# Patient Record
Sex: MALE | Race: BLACK OR AFRICAN AMERICAN | HISPANIC OR LATINO | Employment: OTHER | URBAN - METROPOLITAN AREA
[De-identification: names, ages, dates, MRNs, and addresses within clinical notes are randomized per-mention and may not be internally consistent; named-entity substitution may affect disease eponyms.]

---

## 2023-09-22 PROBLEM — I21.4 NON-ST ELEVATION MYOCARDIAL INFARCTION (NSTEMI) IN RECOVERY PHASE (MULTI): Status: ACTIVE | Noted: 2023-09-22

## 2023-09-22 PROBLEM — F03.90 DEMENTIA WITHOUT BEHAVIORAL DISTURBANCE (MULTI): Status: ACTIVE | Noted: 2023-09-22

## 2023-09-22 RX ORDER — PANTOPRAZOLE SODIUM 20 MG/1
20 TABLET, DELAYED RELEASE ORAL
COMMUNITY

## 2023-09-22 RX ORDER — CHLORHEXIDINE GLUCONATE ORAL RINSE 1.2 MG/ML
SOLUTION DENTAL
COMMUNITY
End: 2023-10-09 | Stop reason: ENTERED-IN-ERROR

## 2023-09-22 RX ORDER — CLOPIDOGREL BISULFATE 75 MG/1
75 TABLET ORAL DAILY
COMMUNITY

## 2023-09-22 RX ORDER — METOPROLOL TARTRATE 25 MG/1
25 TABLET, FILM COATED ORAL 2 TIMES DAILY
COMMUNITY

## 2023-09-22 RX ORDER — DIVALPROEX SODIUM 125 MG/1
TABLET, DELAYED RELEASE ORAL
COMMUNITY
End: 2023-10-09 | Stop reason: ENTERED-IN-ERROR

## 2023-09-22 RX ORDER — DONEPEZIL HYDROCHLORIDE 5 MG/1
TABLET, ORALLY DISINTEGRATING ORAL
COMMUNITY
End: 2023-10-09 | Stop reason: ENTERED-IN-ERROR

## 2023-09-22 RX ORDER — ESCITALOPRAM OXALATE 10 MG/1
10 TABLET ORAL DAILY
COMMUNITY

## 2023-09-22 RX ORDER — ATORVASTATIN CALCIUM 80 MG/1
80 TABLET, FILM COATED ORAL NIGHTLY
COMMUNITY

## 2023-10-09 ENCOUNTER — HOSPITAL ENCOUNTER (OUTPATIENT)
Facility: HOSPITAL | Age: 73
Setting detail: OBSERVATION
Discharge: NURSING, REHAB OR LONG-TERM CARE | End: 2023-10-10
Attending: STUDENT IN AN ORGANIZED HEALTH CARE EDUCATION/TRAINING PROGRAM | Admitting: STUDENT IN AN ORGANIZED HEALTH CARE EDUCATION/TRAINING PROGRAM
Payer: MEDICAID

## 2023-10-09 ENCOUNTER — APPOINTMENT (OUTPATIENT)
Dept: RADIOLOGY | Facility: HOSPITAL | Age: 73
End: 2023-10-09
Payer: MEDICAID

## 2023-10-09 DIAGNOSIS — R07.9 CHEST PAIN, UNSPECIFIED TYPE: ICD-10-CM
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
## 2023-10-09 DIAGNOSIS — S09.90XA HEAD INJURY, INITIAL ENCOUNTER: ICD-10-CM

## 2023-10-09 DIAGNOSIS — R55 SYNCOPE, UNSPECIFIED SYNCOPE TYPE: ICD-10-CM
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
## 2023-10-09 LAB
ABO GROUP (TYPE) IN BLOOD: NORMAL
ALBUMIN SERPL BCP-MCNC: 3.9 G/DL (ref 3.4–5)
ALP SERPL-CCNC: 30 U/L (ref 33–136)
ALT SERPL W P-5'-P-CCNC: 14 U/L (ref 10–52)
ANION GAP SERPL CALC-SCNC: 9 MMOL/L (ref 10–20)
ANTIBODY SCREEN: NORMAL
AST SERPL W P-5'-P-CCNC: 19 U/L (ref 9–39)
BASOPHILS # BLD AUTO: 0.01 X10*3/UL (ref 0–0.1)
BASOPHILS NFR BLD AUTO: 0.2 %
BILIRUB SERPL-MCNC: 0.7 MG/DL (ref 0–1.2)
BUN SERPL-MCNC: 8 MG/DL (ref 6–23)
CALCIUM SERPL-MCNC: 9.1 MG/DL (ref 8.6–10.3)
CARDIAC TROPONIN I PNL SERPL HS: 11 NG/L (ref 0–20)
CARDIAC TROPONIN I PNL SERPL HS: 12 NG/L (ref 0–20)
CHLORIDE SERPL-SCNC: 107 MMOL/L (ref 98–107)
CO2 SERPL-SCNC: 31 MMOL/L (ref 21–32)
CREAT SERPL-MCNC: 1.19 MG/DL (ref 0.5–1.3)
EOSINOPHIL # BLD AUTO: 0.09 X10*3/UL (ref 0–0.4)
EOSINOPHIL NFR BLD AUTO: 1.4 %
ERYTHROCYTE [DISTWIDTH] IN BLOOD BY AUTOMATED COUNT: 16.6 % (ref 11.5–14.5)
ETHANOL SERPL-MCNC: <10 MG/DL
GFR SERPL CREATININE-BSD FRML MDRD: 64 ML/MIN/1.73M*2
GLUCOSE SERPL-MCNC: 96 MG/DL (ref 74–99)
HCT VFR BLD AUTO: 35 % (ref 41–52)
HGB BLD-MCNC: 11.3 G/DL (ref 13.5–17.5)
IMM GRANULOCYTES # BLD AUTO: 0.01 X10*3/UL (ref 0–0.5)
IMM GRANULOCYTES NFR BLD AUTO: 0.2 % (ref 0–0.9)
INR PPP: 1 (ref 0.9–1.1)
LACTATE SERPL-SCNC: 1.2 MMOL/L (ref 0.4–2)
LYMPHOCYTES # BLD AUTO: 1.89 X10*3/UL (ref 0.8–3)
LYMPHOCYTES NFR BLD AUTO: 30 %
MCH RBC QN AUTO: 30.5 PG (ref 26–34)
MCHC RBC AUTO-ENTMCNC: 32.3 G/DL (ref 32–36)
MCV RBC AUTO: 95 FL (ref 80–100)
MONOCYTES # BLD AUTO: 0.72 X10*3/UL (ref 0.05–0.8)
MONOCYTES NFR BLD AUTO: 11.4 %
NEUTROPHILS # BLD AUTO: 3.59 X10*3/UL (ref 1.6–5.5)
NEUTROPHILS NFR BLD AUTO: 56.8 %
NRBC BLD-RTO: 0 /100 WBCS (ref 0–0)
PLATELET # BLD AUTO: 190 X10*3/UL (ref 150–450)
PMV BLD AUTO: 12 FL (ref 7.5–11.5)
POTASSIUM SERPL-SCNC: 4 MMOL/L (ref 3.5–5.3)
PROT SERPL-MCNC: 6.9 G/DL (ref 6.4–8.2)
PROTHROMBIN TIME: 11.3 SECONDS (ref 9.8–12.8)
RBC # BLD AUTO: 3.7 X10*6/UL (ref 4.5–5.9)
RH FACTOR (ANTIGEN D): NORMAL
SODIUM SERPL-SCNC: 143 MMOL/L (ref 136–145)
WBC # BLD AUTO: 6.3 X10*3/UL (ref 4.4–11.3)

## 2023-10-09 PROCEDURE — 2500000004 HC RX 250 GENERAL PHARMACY W/ HCPCS (ALT 636 FOR OP/ED): Performed by: NURSE PRACTITIONER

## 2023-10-09 PROCEDURE — 83605 ASSAY OF LACTIC ACID: CPT | Performed by: STUDENT IN AN ORGANIZED HEALTH CARE EDUCATION/TRAINING PROGRAM

## 2023-10-09 PROCEDURE — 96372 THER/PROPH/DIAG INJ SC/IM: CPT | Performed by: NURSE PRACTITIONER

## 2023-10-09 PROCEDURE — 85610 PROTHROMBIN TIME: CPT | Performed by: STUDENT IN AN ORGANIZED HEALTH CARE EDUCATION/TRAINING PROGRAM

## 2023-10-09 PROCEDURE — 70450 CT HEAD/BRAIN W/O DYE: CPT | Performed by: RADIOLOGY

## 2023-10-09 PROCEDURE — 82077 ASSAY SPEC XCP UR&BREATH IA: CPT | Performed by: STUDENT IN AN ORGANIZED HEALTH CARE EDUCATION/TRAINING PROGRAM

## 2023-10-09 PROCEDURE — 71045 X-RAY EXAM CHEST 1 VIEW: CPT | Mod: FR

## 2023-10-09 PROCEDURE — 86901 BLOOD TYPING SEROLOGIC RH(D): CPT | Performed by: STUDENT IN AN ORGANIZED HEALTH CARE EDUCATION/TRAINING PROGRAM

## 2023-10-09 PROCEDURE — 2500000001 HC RX 250 WO HCPCS SELF ADMINISTERED DRUGS (ALT 637 FOR MEDICARE OP): Performed by: INTERNAL MEDICINE

## 2023-10-09 PROCEDURE — 80053 COMPREHEN METABOLIC PANEL: CPT | Performed by: STUDENT IN AN ORGANIZED HEALTH CARE EDUCATION/TRAINING PROGRAM

## 2023-10-09 PROCEDURE — G0378 HOSPITAL OBSERVATION PER HR: HCPCS

## 2023-10-09 PROCEDURE — 85025 COMPLETE CBC W/AUTO DIFF WBC: CPT | Performed by: STUDENT IN AN ORGANIZED HEALTH CARE EDUCATION/TRAINING PROGRAM

## 2023-10-09 PROCEDURE — 84484 ASSAY OF TROPONIN QUANT: CPT | Performed by: NURSE PRACTITIONER

## 2023-10-09 PROCEDURE — 84484 ASSAY OF TROPONIN QUANT: CPT | Performed by: STUDENT IN AN ORGANIZED HEALTH CARE EDUCATION/TRAINING PROGRAM

## 2023-10-09 PROCEDURE — 71045 X-RAY EXAM CHEST 1 VIEW: CPT | Mod: FOREIGN READ | Performed by: RADIOLOGY

## 2023-10-09 PROCEDURE — 36415 COLL VENOUS BLD VENIPUNCTURE: CPT | Performed by: STUDENT IN AN ORGANIZED HEALTH CARE EDUCATION/TRAINING PROGRAM

## 2023-10-09 PROCEDURE — 72125 CT NECK SPINE W/O DYE: CPT | Performed by: RADIOLOGY

## 2023-10-09 PROCEDURE — 36415 COLL VENOUS BLD VENIPUNCTURE: CPT | Performed by: NURSE PRACTITIONER

## 2023-10-09 PROCEDURE — 93010 ELECTROCARDIOGRAM REPORT: CPT | Performed by: STUDENT IN AN ORGANIZED HEALTH CARE EDUCATION/TRAINING PROGRAM

## 2023-10-09 PROCEDURE — 99285 EMERGENCY DEPT VISIT HI MDM: CPT | Performed by: STUDENT IN AN ORGANIZED HEALTH CARE EDUCATION/TRAINING PROGRAM

## 2023-10-09 PROCEDURE — 2500000001 HC RX 250 WO HCPCS SELF ADMINISTERED DRUGS (ALT 637 FOR MEDICARE OP): Performed by: NURSE PRACTITIONER

## 2023-10-09 PROCEDURE — 70450 CT HEAD/BRAIN W/O DYE: CPT

## 2023-10-09 PROCEDURE — G0390 TRAUMA RESPONS W/HOSP CRITI: HCPCS | Performed by: STUDENT IN AN ORGANIZED HEALTH CARE EDUCATION/TRAINING PROGRAM

## 2023-10-09 PROCEDURE — 72125 CT NECK SPINE W/O DYE: CPT

## 2023-10-09 PROCEDURE — 99221 1ST HOSP IP/OBS SF/LOW 40: CPT | Performed by: INTERNAL MEDICINE

## 2023-10-09 RX ORDER — ACETAMINOPHEN 650 MG/1
650 SUPPOSITORY RECTAL EVERY 4 HOURS PRN
Status: DISCONTINUED | OUTPATIENT
Start: 2023-10-09 | End: 2023-10-10 | Stop reason: HOSPADM

## 2023-10-09 RX ORDER — ESCITALOPRAM OXALATE 10 MG/1
10 TABLET ORAL DAILY
Status: DISCONTINUED | OUTPATIENT
Start: 2023-10-09 | End: 2023-10-10 | Stop reason: HOSPADM

## 2023-10-09 RX ORDER — PANTOPRAZOLE SODIUM 20 MG/1
20 TABLET, DELAYED RELEASE ORAL
Status: DISCONTINUED | OUTPATIENT
Start: 2023-10-10 | End: 2023-10-10 | Stop reason: HOSPADM

## 2023-10-09 RX ORDER — METOPROLOL TARTRATE 25 MG/1
25 TABLET, FILM COATED ORAL 2 TIMES DAILY
Status: DISCONTINUED | OUTPATIENT
Start: 2023-10-09 | End: 2023-10-10 | Stop reason: HOSPADM

## 2023-10-09 RX ORDER — DIVALPROEX SODIUM 125 MG/1
500 CAPSULE, COATED PELLETS ORAL 2 TIMES DAILY
COMMUNITY

## 2023-10-09 RX ORDER — REGADENOSON 0.08 MG/ML
0.4 INJECTION, SOLUTION INTRAVENOUS
Status: CANCELLED | OUTPATIENT
Start: 2023-10-09

## 2023-10-09 RX ORDER — ACETAMINOPHEN 160 MG/5ML
650 SOLUTION ORAL EVERY 4 HOURS PRN
Status: DISCONTINUED | OUTPATIENT
Start: 2023-10-09 | End: 2023-10-10 | Stop reason: HOSPADM

## 2023-10-09 RX ORDER — ISOSORBIDE MONONITRATE 30 MG/1
30 TABLET, EXTENDED RELEASE ORAL DAILY
Status: DISCONTINUED | OUTPATIENT
Start: 2023-10-09 | End: 2023-10-10 | Stop reason: HOSPADM

## 2023-10-09 RX ORDER — CLOPIDOGREL BISULFATE 75 MG/1
75 TABLET ORAL DAILY
Status: DISCONTINUED | OUTPATIENT
Start: 2023-10-09 | End: 2023-10-10 | Stop reason: HOSPADM

## 2023-10-09 RX ORDER — HEPARIN SODIUM 5000 [USP'U]/ML
5000 INJECTION, SOLUTION INTRAVENOUS; SUBCUTANEOUS EVERY 8 HOURS SCHEDULED
Status: DISCONTINUED | OUTPATIENT
Start: 2023-10-09 | End: 2023-10-10 | Stop reason: HOSPADM

## 2023-10-09 RX ORDER — ASPIRIN 81 MG/1
81 TABLET ORAL DAILY
Status: DISCONTINUED | OUTPATIENT
Start: 2023-10-09 | End: 2023-10-10 | Stop reason: HOSPADM

## 2023-10-09 RX ORDER — ACETAMINOPHEN 325 MG/1
650 TABLET ORAL EVERY 4 HOURS PRN
Status: DISCONTINUED | OUTPATIENT
Start: 2023-10-09 | End: 2023-10-10 | Stop reason: HOSPADM

## 2023-10-09 RX ORDER — DONEPEZIL HYDROCHLORIDE 5 MG/1
5 TABLET, FILM COATED ORAL NIGHTLY
COMMUNITY

## 2023-10-09 RX ORDER — ATORVASTATIN CALCIUM 80 MG/1
80 TABLET, FILM COATED ORAL NIGHTLY
Status: DISCONTINUED | OUTPATIENT
Start: 2023-10-09 | End: 2023-10-10 | Stop reason: HOSPADM

## 2023-10-09 RX ORDER — POLYETHYLENE GLYCOL 3350 17 G/17G
17 POWDER, FOR SOLUTION ORAL DAILY
Status: DISCONTINUED | OUTPATIENT
Start: 2023-10-09 | End: 2023-10-10 | Stop reason: HOSPADM

## 2023-10-09 RX ORDER — DONEPEZIL HYDROCHLORIDE 5 MG/1
5 TABLET, FILM COATED ORAL NIGHTLY
Status: DISCONTINUED | OUTPATIENT
Start: 2023-10-09 | End: 2023-10-10 | Stop reason: HOSPADM

## 2023-10-09 RX ORDER — DIVALPROEX SODIUM 125 MG/1
500 CAPSULE, COATED PELLETS ORAL 2 TIMES DAILY
Status: DISCONTINUED | OUTPATIENT
Start: 2023-10-09 | End: 2023-10-10 | Stop reason: HOSPADM

## 2023-10-09 RX ADMIN — HEPARIN SODIUM 5000 UNITS: 5000 INJECTION INTRAVENOUS; SUBCUTANEOUS at 17:37

## 2023-10-09 RX ADMIN — DIVALPROEX SODIUM 500 MG: 125 CAPSULE ORAL at 21:29

## 2023-10-09 RX ADMIN — POLYETHYLENE GLYCOL 3350 17 G: 17 POWDER, FOR SOLUTION ORAL at 17:39

## 2023-10-09 RX ADMIN — ISOSORBIDE MONONITRATE 30 MG: 30 TABLET, EXTENDED RELEASE ORAL at 17:46

## 2023-10-09 RX ADMIN — METOPROLOL TARTRATE 25 MG: 25 TABLET, FILM COATED ORAL at 21:29

## 2023-10-09 RX ADMIN — ATORVASTATIN CALCIUM 80 MG: 80 TABLET, FILM COATED ORAL at 21:29

## 2023-10-09 RX ADMIN — DONEPEZIL HYDROCHLORIDE 5 MG: 5 TABLET ORAL at 21:29

## 2023-10-09 SDOH — SOCIAL STABILITY: SOCIAL INSECURITY: DOES ANYONE TRY TO KEEP YOU FROM HAVING/CONTACTING OTHER FRIENDS OR DOING THINGS OUTSIDE YOUR HOME?: UNABLE TO ASSESS

## 2023-10-09 SDOH — SOCIAL STABILITY: SOCIAL INSECURITY: ARE YOU OR HAVE YOU BEEN THREATENED OR ABUSED PHYSICALLY, EMOTIONALLY, OR SEXUALLY BY ANYONE?: UNABLE TO ASSESS

## 2023-10-09 SDOH — SOCIAL STABILITY: SOCIAL INSECURITY: DO YOU FEEL ANYONE HAS EXPLOITED OR TAKEN ADVANTAGE OF YOU FINANCIALLY OR OF YOUR PERSONAL PROPERTY?: UNABLE TO ASSESS

## 2023-10-09 SDOH — SOCIAL STABILITY: SOCIAL INSECURITY: ARE THERE ANY APPARENT SIGNS OF INJURIES/BEHAVIORS THAT COULD BE RELATED TO ABUSE/NEGLECT?: UNABLE TO ASSESS

## 2023-10-09 SDOH — SOCIAL STABILITY: SOCIAL INSECURITY: HAS ANYONE EVER THREATENED TO HURT YOUR FAMILY OR YOUR PETS?: UNABLE TO ASSESS

## 2023-10-09 SDOH — SOCIAL STABILITY: SOCIAL INSECURITY: DO YOU FEEL UNSAFE GOING BACK TO THE PLACE WHERE YOU ARE LIVING?: UNABLE TO ASSESS

## 2023-10-09 ASSESSMENT — LIFESTYLE VARIABLES
HAVE PEOPLE ANNOYED YOU BY CRITICIZING YOUR DRINKING: NO
EVER FELT BAD OR GUILTY ABOUT YOUR DRINKING: NO
HAVE YOU EVER FELT YOU SHOULD CUT DOWN ON YOUR DRINKING: NO
EVER HAD A DRINK FIRST THING IN THE MORNING TO STEADY YOUR NERVES TO GET RID OF A HANGOVER: NO

## 2023-10-09 ASSESSMENT — PAIN DESCRIPTION - PAIN TYPE: TYPE: ACUTE PAIN

## 2023-10-09 ASSESSMENT — COGNITIVE AND FUNCTIONAL STATUS - GENERAL
EATING MEALS: TOTAL
WALKING IN HOSPITAL ROOM: TOTAL
DRESSING REGULAR UPPER BODY CLOTHING: TOTAL
DAILY ACTIVITIY SCORE: 6
MOVING TO AND FROM BED TO CHAIR: TOTAL
PERSONAL GROOMING: TOTAL
MOBILITY SCORE: 8
TOILETING: TOTAL
DRESSING REGULAR LOWER BODY CLOTHING: TOTAL
CLIMB 3 TO 5 STEPS WITH RAILING: TOTAL
STANDING UP FROM CHAIR USING ARMS: TOTAL
TURNING FROM BACK TO SIDE WHILE IN FLAT BAD: A LOT
HELP NEEDED FOR BATHING: TOTAL
MOVING FROM LYING ON BACK TO SITTING ON SIDE OF FLAT BED WITH BEDRAILS: A LOT

## 2023-10-09 ASSESSMENT — ACTIVITIES OF DAILY LIVING (ADL)
WALKS IN HOME: DEPENDENT
TOILETING: DEPENDENT
DRESSING YOURSELF: DEPENDENT
BATHING: DEPENDENT
FEEDING YOURSELF: NEEDS ASSISTANCE
ADEQUATE_TO_COMPLETE_ADL: UNABLE TO ASSESS
JUDGMENT_ADEQUATE_SAFELY_COMPLETE_DAILY_ACTIVITIES: UNABLE TO ASSESS
GROOMING: DEPENDENT
PATIENT'S MEMORY ADEQUATE TO SAFELY COMPLETE DAILY ACTIVITIES?: UNABLE TO ASSESS

## 2023-10-09 ASSESSMENT — PAIN SCALES - GENERAL
PAINLEVEL_OUTOF10: 0 - NO PAIN
PAINLEVEL_OUTOF10: 4
PAINLEVEL_OUTOF10: 0 - NO PAIN

## 2023-10-09 ASSESSMENT — PAIN - FUNCTIONAL ASSESSMENT
PAIN_FUNCTIONAL_ASSESSMENT: 0-10
PAIN_FUNCTIONAL_ASSESSMENT: 0-10

## 2023-10-09 ASSESSMENT — ENCOUNTER SYMPTOMS
CHEST TIGHTNESS: 0
STRIDOR: 0
PSYCHIATRIC NEGATIVE: 1
DIZZINESS: 0
COUGH: 0
ABDOMINAL PAIN: 0
CHILLS: 0
SHORTNESS OF BREATH: 1
SPEECH DIFFICULTY: 1
HEADACHES: 0
FEVER: 0
ABDOMINAL DISTENTION: 0
SEIZURES: 1
CONSTIPATION: 0
WHEEZING: 0

## 2023-10-09 ASSESSMENT — PAIN DESCRIPTION - PROGRESSION: CLINICAL_PROGRESSION: NOT CHANGED

## 2023-10-09 ASSESSMENT — PAIN DESCRIPTION - ORIENTATION: ORIENTATION: LEFT

## 2023-10-09 ASSESSMENT — COLUMBIA-SUICIDE SEVERITY RATING SCALE - C-SSRS
1. IN THE PAST MONTH, HAVE YOU WISHED YOU WERE DEAD OR WISHED YOU COULD GO TO SLEEP AND NOT WAKE UP?: NO
2. HAVE YOU ACTUALLY HAD ANY THOUGHTS OF KILLING YOURSELF?: NO
6. HAVE YOU EVER DONE ANYTHING, STARTED TO DO ANYTHING, OR PREPARED TO DO ANYTHING TO END YOUR LIFE?: NO

## 2023-10-09 ASSESSMENT — PAIN DESCRIPTION - LOCATION: LOCATION: HIP

## 2023-10-09 NOTE — ED TRIAGE NOTES
Per SNF, patient was having chest pain and fell, hit the back of head, patient currently denies chest pain or any pain. Alert and oriented x2 at baseline. Skin appears intact. Patient arrived from [facility] in [place].

## 2023-10-09 NOTE — ED PROVIDER NOTES
HPI   Chief Complaint   Patient presents with    Fall     On thinners        73-year-old male patient presenting to the ED brought in by EMS for fall with head injury.  He is normally baseline oriented to 2, does not recall the event.  He complained of chest pain prior to the fall, but at the time of exam, he is denying having any chest pain and has no recall of the event.  Is not on blood thinners per report.  Head injury on anticoagulation alert called.                          No data recorded                  Patient History   No past medical history on file.  No past surgical history on file.  Family History   Family history unknown: Yes     Social History     Tobacco Use    Smoking status: Not on file    Smokeless tobacco: Not on file   Substance Use Topics    Alcohol use: Not on file    Drug use: Not on file       Physical Exam   ED Triage Vitals [10/09/23 0930]   Temp Heart Rate Resp BP   36.4 °C (97.5 °F) 60 18 143/79      SpO2 Temp src Heart Rate Source Patient Position   97 % -- -- --      BP Location FiO2 (%)     -- --       Physical Exam  Vitals and nursing note reviewed.   Constitutional:       Appearance: Normal appearance.   HENT:      Head: Normocephalic and atraumatic.      Mouth/Throat:      Mouth: Mucous membranes are moist.   Eyes:      Extraocular Movements: Extraocular movements intact.   Cardiovascular:      Rate and Rhythm: Normal rate and regular rhythm.      Heart sounds: Normal heart sounds. No murmur heard.  Pulmonary:      Effort: Pulmonary effort is normal. No respiratory distress.      Breath sounds: Normal breath sounds. No wheezing.   Abdominal:      General: There is no distension.      Palpations: Abdomen is soft.      Tenderness: There is no abdominal tenderness. There is no guarding.   Musculoskeletal:      Right lower leg: No edema.      Left lower leg: No edema.   Skin:     General: Skin is warm and dry.   Neurological:      General: No focal deficit present.      Mental  Status: He is alert. Mental status is at baseline.   Psychiatric:         Mood and Affect: Mood normal.         Behavior: Behavior normal.         ED Course & MDM   Diagnoses as of 10/13/23 2311   Fall, initial encounter   Head injury, initial encounter   Chest pain, unspecified type   Syncope, unspecified syncope type       Medical Decision Making  Hemodynamically stable and well-appearing on arrival to the ED.  The patient presents after an unwitnessed fall.  There is concern that patient could have syncopized as this was unwitnessed.  He has no recollection of the event.  Does not have any chest pain anymore.  Head injury on anticoagulation alert called.  Trauma evaluation done.    Will obtain cardiac work-up.      EKG independently interpreted by attending physician    1020 hrs.: Normal sinus rhythm ventricular to 60 bpm.  QTc 422.  QRS 74.   Baseline artifact present.  T wave inversions in V1 through V3 which are seen on prior EKGs in March 2023.    His laboratory studies, troponin, CBC and CMP within normal limits.    Imaging, chest no acute findings, CT C-spine and CT head no acute findings on my interpretation. Given he does not recall event and weakness, will admit.    I did discussed the case with on-call hospitalist Dr. Pereira, who has agreed to accept patient for further work-up and management.    Discussed with the attending  Washington Lala DO PGY-4  Emergency Medicine          Procedure  Procedures     Washington Lala DO  Resident  10/09/23 1537       Washington Lala DO  Resident  10/10/23 7980    The patient was seen by the resident/fellow.  I have personally performed a substantive portion of the encounter.  I have seen and examined the patient; agree with the workup, evaluation, MDM, management and diagnosis.  The care plan has been discussed with the resident; I have reviewed the resident’s note and agree with the documented findings.         Maryann Hernandez DO  10/14/23 0226     Deep Sutures: 5-0 PDS

## 2023-10-09 NOTE — CARE PLAN
The patient's goals for the shift include  pt.  Will not  fall by  end  of shift    The clinical goals for the shift include  pt.  Will remain afebrile  by  end of shift

## 2023-10-09 NOTE — H&P
History Of Present Illness  Reza Lockett is a 73 y.o. male presents to [facility] ER with chief complaint of fall. He was brought in by EMS for fall with head injury from nursing facility. He is normally baseline oriented to 2 per ED. He complained of chest pain prior to the fall to facility when he fell to ground. Patient described having chest pain and points to entire mid chest saying it hurt, states felt short of breath and fell to the ground, hitting his head, right arm and left leg. He currently denies any active chest pain or shortness of breath. Has seen Dr. Trejo, Cardiology in the past. He was seen in the ED, eating.      Past Medical History  Remote alcohol abuse, seizures, pneumonia, schizophrenia, Covid 2021 with prolonged hospilzation with mechanical ventilation and pneumonia, pneumothorax    Surgical History  Hernia repair, chest tube     Social History  Former smoker    Family History  Non contributory to admission     Allergies  Patient has no known allergies.    Review of Systems   Constitutional:  Negative for chills and fever.   HENT: Negative.     Respiratory:  Positive for shortness of breath. Negative for cough, chest tightness, wheezing and stridor.    Gastrointestinal:  Negative for abdominal distention, abdominal pain and constipation.   Skin: Negative.    Neurological:  Positive for seizures and speech difficulty. Negative for dizziness and headaches.   Psychiatric/Behavioral: Negative.        ROS: 12 systems reviewed and negative except per HPI above     Physical Exam by System:     Constitutional: Well developed, awake/alert/oriented x 2, no distress, alert and cooperative   ENMT: mucous membranes moist   Head/Neck: Neck supple   Respiratory/Thorax: Patent airways, CTAB, normal breath sounds with good chest expansion, thorax symmetric   Cardiovascular: Regular, rate and rhythm, no murmurs, 2+ equal pulses of the extremities, normal S 1and S 2   Gastrointestinal:  REC EPI-LASEK w/ MMC  goal -1.50 to -1.75. "Nondistended, soft, non-tender, no rebound tenderness or guarding, no masses palpable, no organomegaly, +BS, no bruits   Musculoskeletal: ROM intact, no joint swelling, normal strength   Extremities: normal extremities, no cyanosis edema, contusions or wounds, no clubbing   Neurological: alert and oriented x2, intact senses, motor, response and reflexes,        Last Recorded Vitals  Blood pressure (!) 163/91, pulse 54, temperature 36.4 °C (97.5 °F), resp. rate 16, height 1.778 m (5' 10\"), weight 72.6 kg (160 lb), SpO2 98 %.    Relevant Results  Results for orders placed or performed during the hospital encounter of 10/09/23 (from the past 24 hour(s))   Comprehensive Metabolic Panel   Result Value Ref Range    Glucose 96 74 - 99 mg/dL    Sodium 143 136 - 145 mmol/L    Potassium 4.0 3.5 - 5.3 mmol/L    Chloride 107 98 - 107 mmol/L    Bicarbonate 31 21 - 32 mmol/L    Anion Gap 9 (L) 10 - 20 mmol/L    Urea Nitrogen 8 6 - 23 mg/dL    Creatinine 1.19 0.50 - 1.30 mg/dL    eGFR 64 >60 mL/min/1.73m*2    Calcium 9.1 8.6 - 10.3 mg/dL    Albumin 3.9 3.4 - 5.0 g/dL    Alkaline Phosphatase 30 (L) 33 - 136 U/L    Total Protein 6.9 6.4 - 8.2 g/dL    AST 19 9 - 39 U/L    Bilirubin, Total 0.7 0.0 - 1.2 mg/dL    ALT 14 10 - 52 U/L   Alcohol   Result Value Ref Range    Alcohol <10 <=10 mg/dL   Troponin I, High Sensitivity   Result Value Ref Range    Troponin I, High Sensitivity 11 0 - 20 ng/L   CBC and Auto Differential   Result Value Ref Range    WBC 6.3 4.4 - 11.3 x10*3/uL    nRBC 0.0 0.0 - 0.0 /100 WBCs    RBC 3.70 (L) 4.50 - 5.90 x10*6/uL    Hemoglobin 11.3 (L) 13.5 - 17.5 g/dL    Hematocrit 35.0 (L) 41.0 - 52.0 %    MCV 95 80 - 100 fL    MCH 30.5 26.0 - 34.0 pg    MCHC 32.3 32.0 - 36.0 g/dL    RDW 16.6 (H) 11.5 - 14.5 %    Platelets 190 150 - 450 x10*3/uL    MPV 12.0 (H) 7.5 - 11.5 fL    Neutrophils % 56.8 40.0 - 80.0 %    Immature Granulocytes %, Automated 0.2 0.0 - 0.9 %    Lymphocytes % 30.0 13.0 - 44.0 %    Monocytes % 11.4 " 2.0 - 10.0 %    Eosinophils % 1.4 0.0 - 6.0 %    Basophils % 0.2 0.0 - 2.0 %    Neutrophils Absolute 3.59 1.60 - 5.50 x10*3/uL    Immature Granulocytes Absolute, Automated 0.01 0.00 - 0.50 x10*3/uL    Lymphocytes Absolute 1.89 0.80 - 3.00 x10*3/uL    Monocytes Absolute 0.72 0.05 - 0.80 x10*3/uL    Eosinophils Absolute 0.09 0.00 - 0.40 x10*3/uL    Basophils Absolute 0.01 0.00 - 0.10 x10*3/uL   Lactate   Result Value Ref Range    Lactate 1.2 0.4 - 2.0 mmol/L   Protime-INR   Result Value Ref Range    Protime 11.3 9.8 - 12.8 seconds    INR 1.0 0.9 - 1.1   Type And Screen   Result Value Ref Range    ABO TYPE A     Rh TYPE POS     ANTIBODY SCREEN NEG       Scheduled medications  heparin (porcine), 5,000 Units, subcutaneous, q8h MIREYA  polyethylene glycol, 17 g, oral, Daily      Continuous medications     PRN medications  PRN medications: acetaminophen **OR** acetaminophen **OR** acetaminophen, acetaminophen **OR** acetaminophen **OR** acetaminophen           Assessment/Plan   Fall at nursing home   C/o chest pain r/o ACS  ? syncope  Hx non ST elevation MI  Hx schizophrenia   Hx seizures  Hx dementia    Plan:    Admit to observation with tele  Fall precautions  Cardiology consult: Patient see's Dr. Trejo outpatient setting, await recommnedations  Last Echo 3/18/2023 EF 55%  Troponin 11, will cycle troponin  CT cervical spine in ED: No cervical vertebral compression deformity or acute displaced fracture.  CT head: No acute intracranial hemorrhage or mass-effect.   CXR: Mild elevation left hemidiaphragm with mild left basilar atelectasis.   Cardiac diet  Am labs including cbc, bmp, mag  Resume appropriate home medications  Dvtp: Heparin sub q  Time spent > 50 minutes  POC discussed with patient and attending    Code status: patient not A&Ox3, however, there is a medical record from [place] that came with patient in ED indicating Full Code         Blanca Malin, [facility]  [place]  [address] [address]  Phone: [phone] Fax: [phone]    I spent 50 minutes in the professional and overall care of this patient.    SIGNATURE: BETHANY Brandon PATIENT NAME: Reza Lockett   DATE: October 9, 2023 MRN: [id number]   TIME: 2:15 PM

## 2023-10-09 NOTE — CONSULTS
Inpatient consult to Cardiology  Consult performed by: Silas Trejo MD  Consult ordered by: Blanca Malin, APRN-CNP      Reason for Consult: Chest pain with fall    Assessment/Plan:    Chest pain presumed coronary artery disease: Patient apparently gave a history of chest discomfort prior to falling at his nursing facility.  When I spoke with him he did not recall having chest discomfort.  Cardiac enzymes are unremarkable.  Blood pressure is elevated so we actually have room to add antianginal therapy.  I would add isosorbide to his metoprolol.  Given his underlying dementia and unremarkable troponin I would not recommend stress testing nor cardiac catheterization.  2.  Disposition: Cardiology will sign off.      Peripheral IV 10/09/23 Left Antecubital (Active)   Site Assessment Clean;Dry 10/09/23 1708   Dressing Type Transparent 10/09/23 1708   Dressing Status Clean;Dry 10/09/23 1708   Number of days: 0         History Of Present Illness:    Reza Lockett is a 73 y.o. male presenting with a fall at his nursing home.  The history and physical elicited a history of chest discomfort prior to the fall.  When I spoke with him he had no memory of that.  He really was not able to answer any of my questions to any significant degree when I spoke with him he denied chest discomfort chest tightness or chest pressure at that time.  He denies any shortness of breath..       Past Medical History:  He has no past medical history on file.    Past Surgical History:  He has no past surgical history on file.    Problem List:  Patient Active Problem List   Diagnosis    Dementia without behavioral disturbance (CMS/HCC)    Non-ST elevation myocardial infarction (NSTEMI) in recovery phase (CMS/HCC)    Fall at nursing home    Fall at nursing home, initial encounter       Social History:  He has no history on file for tobacco use, alcohol use, and drug use.    Family History:  Family History   Family history unknown: Yes         Allergies:  Patient has no known allergies.    Inpatient Medications:  Scheduled medications   Medication Dose Route Frequency    aspirin  81 mg oral Daily    atorvastatin  80 mg oral Nightly    clopidogrel  75 mg oral Daily    divalproex sprinkle  500 mg oral BID    donepezil  5 mg oral Nightly    escitalopram  10 mg oral Daily    heparin (porcine)  5,000 Units subcutaneous q8h [facility]    isosorbide mononitrate ER  30 mg oral Daily    lactobacillus acidophilus  1 capsule oral Daily    metoprolol tartrate  25 mg oral BID    [START ON 10/10/2023] pantoprazole  20 mg oral Daily before breakfast    polyethylene glycol  17 g oral Daily     PRN medications   Medication    acetaminophen    Or    acetaminophen    Or    acetaminophen    acetaminophen    Or    acetaminophen    Or    acetaminophen     Continuous Medications   Medication Dose Last Rate     Outpatient Medications:  Current Outpatient Medications   Medication Instructions    aspirin 81 mg, oral, Daily    atorvastatin (LIPITOR) 80 mg, oral, Nightly    clopidogrel (PLAVIX) 75 mg, oral, Daily    divalproex sprinkle (DEPAKOTE SPRINKLE) 500 mg, oral, 2 times daily    donepezil (ARICEPT) 5 mg, oral, Nightly    escitalopram (LEXAPRO) 10 mg, oral, Daily    LACTOBACILLUS ACIDOPHILUS ORAL 1 tablet, oral, Daily    metoprolol tartrate (LOPRESSOR) 25 mg, oral, 2 times daily    pantoprazole (PROTONIX) 20 mg, oral, Daily before breakfast, Do not crush, chew, or split.       Last Recorded Vitals:  Vitals:    10/09/23 1151 10/09/23 1158 10/09/23 1230 10/09/23 1421   BP:   (!) 163/91 151/90   BP Location:    Right arm   Pulse: 58 57 54 57   Resp: 21 23 16 16   Temp:       SpO2:   98% 98%   Weight:       Height:         Last I/O:  No intake/output data recorded.    Physical Exam  Vitals reviewed.   Constitutional:       Appearance: Normal appearance.   Eyes:      Pupils: Pupils are equal, round, and reactive to light.   Neck:      Vascular: No JVD.   Cardiovascular:      Rate and  Rhythm: Normal rate and regular rhythm.      Pulses: Normal pulses.      Heart sounds: No murmur heard.     No gallop.   Pulmonary:      Effort: No respiratory distress.      Breath sounds: No wheezing or rales.   Abdominal:      General: Abdomen is flat. There is no distension.      Palpations: Abdomen is soft.   Musculoskeletal:         General: No swelling.      Right lower leg: No edema.      Left lower leg: No edema.   Neurological:      General: No focal deficit present.   Psychiatric:         Mood and Affect: Mood normal. Affect is flat.         Speech: Speech is delayed.         Behavior: Behavior is slowed. Behavior is cooperative.         Cognition and Memory: Cognition is impaired. Memory is impaired.            Last Labs:  CBC - 10/9/2023: 10:14 AM  6.3 11.3 190    35.0      CMP - 10/9/2023: 10:13 AM  9.1 6.9 19 --- 0.7   3.4 3.9 14 30      Troponin I, High Sensitivity   Date/Time Value Ref Range Status   10/09/2023 04:05 PM 12 0 - 20 ng/L Final   10/09/2023 10:13 AM 11 0 - 20 ng/L Final     Troponin I   Date/Time Value Ref Range Status   03/17/2023 06:01 PM 1,754 (H) 0 - 20 ng/L Final     Comment:     .  Less than 99th percentile of normal range cutoff-  Female and children under 18 years old <14 ng/L; Male <21 ng/L: Negative  Repeat testing should be performed if clinically indicated.   .  Female and children under 18 years old 14-50 ng/L; Male 21-50 ng/L:  Consistent with possible cardiac damage and possible increased clinical   risk. Serial measurements may help to assess extent of myocardial damage.   .  >50 ng/L: Consistent with cardiac damage, increased clinical risk and  myocardial infarction. Serial measurements may help assess extent of   myocardial damage.   .   NOTE: Children less than 1 year old may have higher baseline troponin   levels and results should be interpreted in conjunction with the overall   clinical context.   .  NOTE: Troponin I testing is performed using a different   testing  methodology at [facility] than at other   [facility]. Direct result comparisons should only   be made within the same method.  This is a critical result.    Per Laboratory policy, critical results for this test   only qualify to the call list once per 24 hours.      03/17/2023 04:03 PM 1,641 (H) 0 - 20 ng/L Final     Comment:     .  Less than 99th percentile of normal range cutoff-  Female and children under 18 years old <14 ng/L; Male <21 ng/L: Negative  Repeat testing should be performed if clinically indicated.   .  Female and children under 18 years old 14-50 ng/L; Male 21-50 ng/L:  Consistent with possible cardiac damage and possible increased clinical   risk. Serial measurements may help to assess extent of myocardial damage.   .  >50 ng/L: Consistent with cardiac damage, increased clinical risk and  myocardial infarction. Serial measurements may help assess extent of   myocardial damage.   .   NOTE: Children less than 1 year old may have higher baseline troponin   levels and results should be interpreted in conjunction with the overall   clinical context.   .  NOTE: Troponin I testing is performed using a different   testing methodology at [facility] than at other   [facility]. Direct result comparisons should only   be made within the same method.  This is a critical result.    Per Laboratory policy, critical results for this test   only qualify to the call list once per 24 hours.      03/17/2023 03:06 PM 2,100 (HH) 0 - 20 ng/L Final     Comment:     .  Less than 99th percentile of normal range cutoff-  Female and children under 18 years old <14 ng/L; Male <21 ng/L: Negative  Repeat testing should be performed if clinically indicated.   .  Female and children under 18 years old 14-50 ng/L; Male 21-50 ng/L:  Consistent with possible cardiac damage and possible increased clinical   risk. Serial measurements may help to assess extent of myocardial damage.   .  >50 ng/L:  Consistent with cardiac damage, increased clinical risk and  myocardial infarction. Serial measurements may help assess extent of   myocardial damage.   .   NOTE: Children less than 1 year old may have higher baseline troponin   levels and results should be interpreted in conjunction with the overall   clinical context.   .  NOTE: Troponin I testing is performed using a different   testing methodology at [facility] than at other   [facility]. Direct result comparisons should only   be made within the same method.  [facility] CALLED TO IGOR HOPE, 03/17/2023 16:25       VLDL   Date/Time Value Ref Range Status   03/18/2023 03:46 AM 22 0 - 40 mg/dL Final           Silas Trejo MD

## 2023-10-10 VITALS
OXYGEN SATURATION: 100 % | DIASTOLIC BLOOD PRESSURE: 74 MMHG | BODY MASS INDEX: 22.57 KG/M2 | HEIGHT: 72 IN | RESPIRATION RATE: 18 BRPM | TEMPERATURE: 97 F | HEART RATE: 56 BPM | WEIGHT: 166.67 LBS | SYSTOLIC BLOOD PRESSURE: 119 MMHG
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
## 2023-10-10 PROBLEM — R07.9 CHEST PAIN: Status: ACTIVE | Noted: 2023-10-10
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
## 2023-10-10 PROBLEM — R07.9 CHEST PAIN: Status: RESOLVED | Noted: 2023-10-10 | Resolved: 2023-10-10
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
## 2023-10-10 LAB
ANION GAP SERPL CALC-SCNC: 10 MMOL/L (ref 10–20)
BUN SERPL-MCNC: 11 MG/DL (ref 6–23)
CALCIUM SERPL-MCNC: 9 MG/DL (ref 8.6–10.3)
CHLORIDE SERPL-SCNC: 109 MMOL/L (ref 98–107)
CO2 SERPL-SCNC: 26 MMOL/L (ref 21–32)
CREAT SERPL-MCNC: 1.06 MG/DL (ref 0.5–1.3)
ERYTHROCYTE [DISTWIDTH] IN BLOOD BY AUTOMATED COUNT: 16.4 % (ref 11.5–14.5)
GFR SERPL CREATININE-BSD FRML MDRD: 73 ML/MIN/1.73M*2
GLUCOSE SERPL-MCNC: 87 MG/DL (ref 74–99)
HCT VFR BLD AUTO: 33.5 % (ref 41–52)
HGB BLD-MCNC: 10.3 G/DL (ref 13.5–17.5)
MAGNESIUM SERPL-MCNC: 1.97 MG/DL (ref 1.6–2.4)
MCH RBC QN AUTO: 30.9 PG (ref 26–34)
MCHC RBC AUTO-ENTMCNC: 30.7 G/DL (ref 32–36)
MCV RBC AUTO: 101 FL (ref 80–100)
NRBC BLD-RTO: 0 /100 WBCS (ref 0–0)
PLATELET # BLD AUTO: 156 X10*3/UL (ref 150–450)
PMV BLD AUTO: 12.6 FL (ref 7.5–11.5)
POTASSIUM SERPL-SCNC: 4.2 MMOL/L (ref 3.5–5.3)
RBC # BLD AUTO: 3.33 X10*6/UL (ref 4.5–5.9)
SODIUM SERPL-SCNC: 141 MMOL/L (ref 136–145)
WBC # BLD AUTO: 5.4 X10*3/UL (ref 4.4–11.3)

## 2023-10-10 PROCEDURE — 83735 ASSAY OF MAGNESIUM: CPT | Performed by: NURSE PRACTITIONER

## 2023-10-10 PROCEDURE — 36415 COLL VENOUS BLD VENIPUNCTURE: CPT | Performed by: NURSE PRACTITIONER

## 2023-10-10 PROCEDURE — 85027 COMPLETE CBC AUTOMATED: CPT | Performed by: NURSE PRACTITIONER

## 2023-10-10 PROCEDURE — 96372 THER/PROPH/DIAG INJ SC/IM: CPT | Performed by: NURSE PRACTITIONER

## 2023-10-10 PROCEDURE — G0378 HOSPITAL OBSERVATION PER HR: HCPCS

## 2023-10-10 PROCEDURE — 2500000001 HC RX 250 WO HCPCS SELF ADMINISTERED DRUGS (ALT 637 FOR MEDICARE OP): Performed by: NURSE PRACTITIONER

## 2023-10-10 PROCEDURE — 80048 BASIC METABOLIC PNL TOTAL CA: CPT | Performed by: NURSE PRACTITIONER

## 2023-10-10 PROCEDURE — 2500000004 HC RX 250 GENERAL PHARMACY W/ HCPCS (ALT 636 FOR OP/ED): Performed by: NURSE PRACTITIONER

## 2023-10-10 PROCEDURE — 2500000001 HC RX 250 WO HCPCS SELF ADMINISTERED DRUGS (ALT 637 FOR MEDICARE OP): Performed by: INTERNAL MEDICINE

## 2023-10-10 RX ORDER — ISOSORBIDE MONONITRATE 30 MG/1
30 TABLET, EXTENDED RELEASE ORAL DAILY
Qty: 30 TABLET | Refills: 0 | Status: SHIPPED | OUTPATIENT
Start: 2023-10-11 | End: 2023-10-10 | Stop reason: SDUPTHER

## 2023-10-10 RX ORDER — ISOSORBIDE MONONITRATE 30 MG/1
30 TABLET, EXTENDED RELEASE ORAL DAILY
Start: 2023-10-11

## 2023-10-10 RX ADMIN — HEPARIN SODIUM 5000 UNITS: 5000 INJECTION INTRAVENOUS; SUBCUTANEOUS at 09:14

## 2023-10-10 RX ADMIN — HEPARIN SODIUM 5000 UNITS: 5000 INJECTION INTRAVENOUS; SUBCUTANEOUS at 02:07

## 2023-10-10 RX ADMIN — DIVALPROEX SODIUM 500 MG: 125 CAPSULE ORAL at 09:13

## 2023-10-10 RX ADMIN — ESCITALOPRAM OXALATE 10 MG: 10 TABLET ORAL at 09:14

## 2023-10-10 RX ADMIN — METOPROLOL TARTRATE 25 MG: 25 TABLET, FILM COATED ORAL at 09:12

## 2023-10-10 RX ADMIN — POLYETHYLENE GLYCOL 3350 17 G: 17 POWDER, FOR SOLUTION ORAL at 09:12

## 2023-10-10 RX ADMIN — PANTOPRAZOLE SODIUM 20 MG: 20 TABLET, DELAYED RELEASE ORAL at 09:12

## 2023-10-10 RX ADMIN — CLOPIDOGREL BISULFATE 75 MG: 75 TABLET ORAL at 09:13

## 2023-10-10 RX ADMIN — Medication 1 CAPSULE: at 09:13

## 2023-10-10 RX ADMIN — ISOSORBIDE MONONITRATE 30 MG: 30 TABLET, EXTENDED RELEASE ORAL at 09:13

## 2023-10-10 RX ADMIN — ASPIRIN 81 MG: 81 TABLET, COATED ORAL at 09:12

## 2023-10-10 ASSESSMENT — COGNITIVE AND FUNCTIONAL STATUS - GENERAL
PERSONAL GROOMING: TOTAL
TOILETING: TOTAL
EATING MEALS: TOTAL
MOVING FROM LYING ON BACK TO SITTING ON SIDE OF FLAT BED WITH BEDRAILS: A LOT
WALKING IN HOSPITAL ROOM: TOTAL
STANDING UP FROM CHAIR USING ARMS: TOTAL
CLIMB 3 TO 5 STEPS WITH RAILING: TOTAL
HELP NEEDED FOR BATHING: TOTAL
MOBILITY SCORE: 8
DRESSING REGULAR LOWER BODY CLOTHING: TOTAL
TURNING FROM BACK TO SIDE WHILE IN FLAT BAD: A LOT
DAILY ACTIVITIY SCORE: 6
MOVING TO AND FROM BED TO CHAIR: TOTAL
DRESSING REGULAR UPPER BODY CLOTHING: TOTAL

## 2023-10-10 ASSESSMENT — PAIN SCALES - GENERAL: PAINLEVEL_OUTOF10: 0 - NO PAIN

## 2023-10-10 ASSESSMENT — PAIN - FUNCTIONAL ASSESSMENT: PAIN_FUNCTIONAL_ASSESSMENT: 0-10

## 2023-10-10 NOTE — DISCHARGE SUMMARY
Discharge Diagnosis  Fall at nursing home, initial encounter    Issues Requiring Follow-Up  Follow up with PCP within 1 week of discharge  Follow up with cardiology Dr. Trejo within 2 weeks of discharge  New medications at time of discharge      Discharge Meds     Your medication list        START taking these medications        Instructions Last Dose Given Next Dose Due   isosorbide mononitrate ER 30 mg 24 hr tablet  Commonly known as: Imdur  Start taking on: October 11, 2023      Take 1 tablet (30 mg) by mouth once daily. Do not crush or chew. Do not start before October 11, 2023.              CONTINUE taking these medications        Instructions Last Dose Given Next Dose Due   aspirin 81 mg capsule           atorvastatin 80 mg tablet  Commonly known as: Lipitor           clopidogrel 75 mg tablet  Commonly known as: Plavix           divalproex sprinkle 125 mg DR capsule  Commonly known as: Depakote Sprinkle           donepezil 5 mg tablet  Commonly known as: Aricept           escitalopram 10 mg tablet  Commonly known as: Lexapro           LACTOBACILLUS ACIDOPHILUS ORAL           metoprolol tartrate 25 mg tablet  Commonly known as: Lopressor           pantoprazole 20 mg EC tablet  Commonly known as: ProtoNix                     Where to Get Your Medications        These medications were sent to [facility] [place]  [place] - [address] [facility]  [address] [address] [facility] [address] [facility] [address]      Phone: [phone]   isosorbide mononitrate ER 30 mg 24 hr tablet         Test Results Pending At Discharge  Pending Labs       No current pending labs.            Hospital Course       History Of Present Illness  Reza Lockett is a 73 y.o. male presents to [facility] ER with chief complaint of fall. He was brought in by EMS for fall with head injury from nursing facility. He is normally baseline oriented to 2 per ED. He complained of chest pain prior to the fall to facility when he fell to ground.  Patient described having chest pain and points to entire mid chest saying it hurt, states felt short of breath and fell to the ground, hitting his head, right arm and left leg. He currently denies any active chest pain or shortness of breath. Has seen Dr. Trejo, Cardiology in the past. He was seen in the ED, eating.       Hospital course: Imaging completed. Seen by cardiology, add isosorbide to his metoprolol.  Given his underlying dementia and unremarkable troponin I would not recommend stress testing nor cardiac catheterization.  Cardiac enzymes are unremarkable.  Blood pressure is elevated so we actually have room to add antianginal therapy.   This morning patient has no complaints.     Pertinent Physical Exam At Time of Discharge  Physical Exam  Constitutional: Well developed, awake/alert/oriented x 2, no distress, alert and cooperative   ENMT: mucous membranes moist   Head/Neck: Neck supple   Respiratory/Thorax: Patent airways, CTAB, normal breath sounds with good chest expansion, thorax symmetric   Cardiovascular: Regular, rate and rhythm, no murmurs, 2+ equal pulses of the extremities, normal S 1and S 2   Gastrointestinal: Nondistended, soft, non-tender, no rebound tenderness or guarding, no masses palpable, no organomegaly, +BS, no bruits   Musculoskeletal: ROM intact, no joint swelling, normal strength   Extremities: normal extremities, no cyanosis edema, contusions or wounds, no clubbing   Neurological: alert and oriented x2, intact senses, motor, response and reflexes,     Outpatient Follow-Up  Future Appointments   Date Time Provider Department Center   10/23/2023 11:40 AM Silas Trejo MD [id number] [place]         Blanca Malin, APRN-CNP

## 2023-10-10 NOTE — CARE PLAN
The patient's goals for the shift include pt.  will  remain stable  by  end  of shift    The clinical goals for the shift include remain  afebrile  by  endof shift    Over the shift, the patient did not make progress toward the following goals. Barriers to progression include poor memory. Recommendations to address these barriers include therapy .

## 2023-10-11 ENCOUNTER — HOSPITAL ENCOUNTER (OUTPATIENT)
Dept: CARDIOLOGY | Facility: HOSPITAL | Age: 73
Discharge: HOME | End: 2023-10-11
Payer: MEDICAID

## 2023-10-11 LAB
ATRIAL RATE: 60 BPM
P AXIS: 88 DEGREES
P OFFSET: 209 MS
P ONSET: 162 MS
PR INTERVAL: 134 MS
Q ONSET: 229 MS
QRS COUNT: 10 BEATS
QRS DURATION: 74 MS
QT INTERVAL: 422 MS
QTC CALCULATION(BAZETT): 422 MS
QTC FREDERICIA: 422 MS
R AXIS: 25 DEGREES
T AXIS: 100 DEGREES
T OFFSET: 440 MS
VENTRICULAR RATE: 60 BPM

## 2023-10-11 PROCEDURE — 93005 ELECTROCARDIOGRAM TRACING: CPT

## 2023-10-11 PROCEDURE — 93010 ELECTROCARDIOGRAM REPORT: CPT | Performed by: INTERNAL MEDICINE

## 2023-10-23 ENCOUNTER — OFFICE VISIT (OUTPATIENT)
Dept: CARDIOLOGY | Facility: CLINIC | Age: 73
End: 2023-10-23
Payer: MEDICAID

## 2023-10-23 VITALS
BODY MASS INDEX: 23.48 KG/M2 | SYSTOLIC BLOOD PRESSURE: 108 MMHG | DIASTOLIC BLOOD PRESSURE: 78 MMHG | WEIGHT: 164 LBS | HEIGHT: 70 IN

## 2023-10-23 DIAGNOSIS — I25.10 CORONARY ARTERY DISEASE INVOLVING NATIVE CORONARY ARTERY OF NATIVE HEART WITHOUT ANGINA PECTORIS: Primary | ICD-10-CM

## 2023-10-23 PROCEDURE — 1159F MED LIST DOCD IN RCRD: CPT | Performed by: INTERNAL MEDICINE

## 2023-10-23 PROCEDURE — 99213 OFFICE O/P EST LOW 20 MIN: CPT | Performed by: INTERNAL MEDICINE

## 2023-10-23 PROCEDURE — 1126F AMNT PAIN NOTED NONE PRSNT: CPT | Performed by: INTERNAL MEDICINE

## 2023-10-23 PROCEDURE — 1036F TOBACCO NON-USER: CPT | Performed by: INTERNAL MEDICINE

## 2023-10-23 ASSESSMENT — ENCOUNTER SYMPTOMS
OCCASIONAL FEELINGS OF UNSTEADINESS: 1
DEPRESSION: 0
LOSS OF SENSATION IN FEET: 0

## 2023-10-23 NOTE — PROGRESS NOTES
Name : Reza Lockett   : 1950   MRN : [id number]   ENC Date : 10/23/2023      Assessment and Plan:  Presumed coronary artery disease: Patient is without angina.  Unclear if during prior hospitalization his chest discomfort was actually real or not.  Regardless he has tolerated the addition of isosorbide to his medical regimen.  Given he is doing well I do not think there is any reason to change his regimen at this point.  Given his underlying dementia we would just continue with medical therapy.  No further testing is needed.  Disp: RTO in 6 months    HPI:  Patient is seen today status post hospitalization earlier this October.  He was discharged home with the addition of isosorbide to his metoprolol aspirin and Plavix.  He is tolerating these medications well.  He reports no chest discomfort.  The caregiver with him today also confirmed no symptoms of chest pain.  No syncopal events.    Problem list overview:   Patient Active Problem List   Diagnosis    Dementia without behavioral disturbance (CMS/[facility])    Non-ST elevation myocardial infarction (NSTEMI) in recovery phase (CMS/[facility])       Meds:   Current Outpatient Medications on File Prior to Visit   Medication Sig Dispense Refill    aspirin 81 mg capsule Take 81 mg by mouth once daily.      atorvastatin (Lipitor) 80 mg tablet Take 1 tablet (80 mg) by mouth once daily at bedtime.      clopidogrel (Plavix) 75 mg tablet Take 1 tablet (75 mg) by mouth once daily.      divalproex sprinkle (Depakote Sprinkle) 125 mg DR capsule Take 4 capsules (500 mg) by mouth 2 times a day.      donepezil (Aricept) 5 mg tablet Take 1 tablet (5 mg) by mouth once daily at bedtime.      escitalopram (Lexapro) 10 mg tablet Take 1 tablet (10 mg) by mouth once daily.      isosorbide mononitrate ER (Imdur) 30 mg 24 hr tablet Take 1 tablet (30 mg) by mouth once daily. Do not crush or chew. Do not start before 2023.      LACTOBACILLUS ACIDOPHILUS ORAL Take 1 tablet by mouth  "once daily.      metoprolol tartrate (Lopressor) 25 mg tablet Take 1 tablet (25 mg) by mouth 2 times a day.      pantoprazole (ProtoNix) 20 mg EC tablet Take 1 tablet (20 mg) by mouth once daily in the morning. Take before meals. Do not crush, chew, or split.       No current facility-administered medications on file prior to visit.        VS:  /78 (BP Location: Right arm, Patient Position: Sitting)   Ht 1.778 m (5' 10\")   Wt 74.4 kg (164 lb)   BMI 23.53 kg/m²     Vitals reviewed.   Neck:      Vascular: No JVD.   Pulmonary:      Breath sounds: Normal breath sounds.   Cardiovascular:      Normal rate. Regular rhythm.      Murmurs: There is no murmur.      No gallop.    Edema:     Peripheral edema absent.   Abdominal:      General: Abdomen is flat.      Palpations: Abdomen is soft.   Skin:     General: Skin is warm.   Psychiatric:         Cognition and Memory: Cognition is impaired. Memory is impaired.         Silas Trejo MD  "

## 2023-11-01 ENCOUNTER — LAB REQUISITION (OUTPATIENT)
Dept: LAB | Facility: HOSPITAL | Age: 73
End: 2023-11-01
Payer: MEDICAID

## 2023-11-01 DIAGNOSIS — F03.918 UNSPECIFIED DEMENTIA, UNSPECIFIED SEVERITY, WITH OTHER BEHAVIORAL DISTURBANCE (MULTI): ICD-10-CM

## 2023-11-01 DIAGNOSIS — G30.9 ALZHEIMER'S DISEASE, UNSPECIFIED (CODE) (MULTI): ICD-10-CM

## 2023-11-01 DIAGNOSIS — I10 ESSENTIAL (PRIMARY) HYPERTENSION: ICD-10-CM

## 2023-11-01 DIAGNOSIS — E83.42 HYPOMAGNESEMIA: ICD-10-CM

## 2023-11-01 DIAGNOSIS — D64.9 ANEMIA, UNSPECIFIED: ICD-10-CM

## 2023-11-01 LAB
25(OH)D3 SERPL-MCNC: 19 NG/ML (ref 30–100)
ALBUMIN SERPL BCP-MCNC: 4.2 G/DL (ref 3.4–5)
ALP SERPL-CCNC: 37 U/L (ref 33–136)
ALT SERPL W P-5'-P-CCNC: 10 U/L (ref 10–52)
ANION GAP SERPL CALC-SCNC: 11 MMOL/L (ref 10–20)
AST SERPL W P-5'-P-CCNC: 15 U/L (ref 9–39)
BASOPHILS # BLD AUTO: 0.04 X10*3/UL (ref 0–0.1)
BASOPHILS NFR BLD AUTO: 0.8 %
BILIRUB SERPL-MCNC: 0.8 MG/DL (ref 0–1.2)
BUN SERPL-MCNC: 11 MG/DL (ref 6–23)
CALCIUM SERPL-MCNC: 9 MG/DL (ref 8.6–10.3)
CHLORIDE SERPL-SCNC: 110 MMOL/L (ref 98–107)
CO2 SERPL-SCNC: 27 MMOL/L (ref 21–32)
CREAT SERPL-MCNC: 0.95 MG/DL (ref 0.5–1.3)
EOSINOPHIL # BLD AUTO: 0.16 X10*3/UL (ref 0–0.4)
EOSINOPHIL NFR BLD AUTO: 3.2 %
ERYTHROCYTE [DISTWIDTH] IN BLOOD BY AUTOMATED COUNT: 16.5 % (ref 11.5–14.5)
EST. AVERAGE GLUCOSE BLD GHB EST-MCNC: 111 MG/DL
FOLATE SERPL-MCNC: 13 NG/ML
GFR SERPL CREATININE-BSD FRML MDRD: 83 ML/MIN/1.73M*2
GLUCOSE SERPL-MCNC: 90 MG/DL (ref 74–99)
HBA1C MFR BLD: 5.5 %
HCT VFR BLD AUTO: 34.9 % (ref 41–52)
HGB BLD-MCNC: 10.9 G/DL (ref 13.5–17.5)
HOLD SPECIMEN: NORMAL
IMM GRANULOCYTES # BLD AUTO: 0.01 X10*3/UL (ref 0–0.5)
IMM GRANULOCYTES NFR BLD AUTO: 0.2 % (ref 0–0.9)
LYMPHOCYTES # BLD AUTO: 2.04 X10*3/UL (ref 0.8–3)
LYMPHOCYTES NFR BLD AUTO: 40.3 %
MAGNESIUM SERPL-MCNC: 2.04 MG/DL (ref 1.6–2.4)
MCH RBC QN AUTO: 31.3 PG (ref 26–34)
MCHC RBC AUTO-ENTMCNC: 31.2 G/DL (ref 32–36)
MCV RBC AUTO: 100 FL (ref 80–100)
MONOCYTES # BLD AUTO: 0.48 X10*3/UL (ref 0.05–0.8)
MONOCYTES NFR BLD AUTO: 9.5 %
NEUTROPHILS # BLD AUTO: 2.33 X10*3/UL (ref 1.6–5.5)
NEUTROPHILS NFR BLD AUTO: 46 %
NRBC BLD-RTO: 0 /100 WBCS (ref 0–0)
PLATELET # BLD AUTO: 242 X10*3/UL (ref 150–450)
PMV BLD AUTO: 12.4 FL (ref 7.5–11.5)
POTASSIUM SERPL-SCNC: 3.7 MMOL/L (ref 3.5–5.3)
PROT SERPL-MCNC: 6.6 G/DL (ref 6.4–8.2)
RBC # BLD AUTO: 3.48 X10*6/UL (ref 4.5–5.9)
SODIUM SERPL-SCNC: 144 MMOL/L (ref 136–145)
TSH SERPL-ACNC: 2.75 MIU/L (ref 0.44–3.98)
VALPROATE SERPL-MCNC: 96 UG/ML (ref 50–100)
VIT B12 SERPL-MCNC: 589 PG/ML (ref 211–911)
WBC # BLD AUTO: 5.1 X10*3/UL (ref 4.4–11.3)

## 2023-11-01 PROCEDURE — 85025 COMPLETE CBC W/AUTO DIFF WBC: CPT | Mod: OUT | Performed by: INTERNAL MEDICINE

## 2023-11-01 PROCEDURE — 82306 VITAMIN D 25 HYDROXY: CPT | Mod: OUT,PARLAB | Performed by: INTERNAL MEDICINE

## 2023-11-01 PROCEDURE — 84443 ASSAY THYROID STIM HORMONE: CPT | Mod: OUT | Performed by: INTERNAL MEDICINE

## 2023-11-01 PROCEDURE — 82607 VITAMIN B-12: CPT | Mod: OUT,PARLAB | Performed by: INTERNAL MEDICINE

## 2023-11-01 PROCEDURE — 82746 ASSAY OF FOLIC ACID SERUM: CPT | Mod: OUT,PARLAB | Performed by: INTERNAL MEDICINE

## 2023-11-01 PROCEDURE — 83735 ASSAY OF MAGNESIUM: CPT | Mod: OUT | Performed by: INTERNAL MEDICINE

## 2023-11-01 PROCEDURE — 80053 COMPREHEN METABOLIC PANEL: CPT | Mod: OUT | Performed by: INTERNAL MEDICINE

## 2023-11-01 PROCEDURE — 83036 HEMOGLOBIN GLYCOSYLATED A1C: CPT | Mod: OUT | Performed by: INTERNAL MEDICINE

## 2023-11-01 PROCEDURE — 80164 ASSAY DIPROPYLACETIC ACD TOT: CPT | Mod: OUT | Performed by: INTERNAL MEDICINE

## 2023-11-09 ENCOUNTER — LAB REQUISITION (OUTPATIENT)
Dept: LAB | Facility: HOSPITAL | Age: 73
End: 2023-11-09
Payer: MEDICAID

## 2023-11-09 DIAGNOSIS — J44.9 CHRONIC OBSTRUCTIVE PULMONARY DISEASE, UNSPECIFIED (MULTI): ICD-10-CM

## 2023-11-09 DIAGNOSIS — F03.90 UNSPECIFIED DEMENTIA, UNSPECIFIED SEVERITY, WITHOUT BEHAVIORAL DISTURBANCE, PSYCHOTIC DISTURBANCE, MOOD DISTURBANCE, AND ANXIETY (MULTI): ICD-10-CM

## 2023-11-09 LAB
ANION GAP SERPL CALC-SCNC: 12 MMOL/L (ref 10–20)
BUN SERPL-MCNC: 11 MG/DL (ref 6–23)
CALCIUM SERPL-MCNC: 8.5 MG/DL (ref 8.6–10.3)
CHLORIDE SERPL-SCNC: 111 MMOL/L (ref 98–107)
CO2 SERPL-SCNC: 23 MMOL/L (ref 21–32)
CREAT SERPL-MCNC: 1.06 MG/DL (ref 0.5–1.3)
ERYTHROCYTE [DISTWIDTH] IN BLOOD BY AUTOMATED COUNT: 15.9 % (ref 11.5–14.5)
GFR SERPL CREATININE-BSD FRML MDRD: 73 ML/MIN/1.73M*2
GLUCOSE SERPL-MCNC: 84 MG/DL (ref 74–99)
HCT VFR BLD AUTO: 31.1 % (ref 41–52)
HGB BLD-MCNC: 10 G/DL (ref 13.5–17.5)
MCH RBC QN AUTO: 31.3 PG (ref 26–34)
MCHC RBC AUTO-ENTMCNC: 32.2 G/DL (ref 32–36)
MCV RBC AUTO: 98 FL (ref 80–100)
NRBC BLD-RTO: 0 /100 WBCS (ref 0–0)
PLATELET # BLD AUTO: 183 X10*3/UL (ref 150–450)
POTASSIUM SERPL-SCNC: 3.8 MMOL/L (ref 3.5–5.3)
RBC # BLD AUTO: 3.19 X10*6/UL (ref 4.5–5.9)
SODIUM SERPL-SCNC: 142 MMOL/L (ref 136–145)
WBC # BLD AUTO: 4.4 X10*3/UL (ref 4.4–11.3)

## 2023-11-09 PROCEDURE — 80048 BASIC METABOLIC PNL TOTAL CA: CPT | Mod: OUT | Performed by: INTERNAL MEDICINE

## 2023-11-09 PROCEDURE — 85027 COMPLETE CBC AUTOMATED: CPT | Mod: OUT | Performed by: INTERNAL MEDICINE

## 2023-12-18 ENCOUNTER — LAB REQUISITION (OUTPATIENT)
Dept: LAB | Facility: HOSPITAL | Age: 73
End: 2023-12-18
Payer: MEDICAID

## 2023-12-18 DIAGNOSIS — R11.10 VOMITING, UNSPECIFIED: ICD-10-CM

## 2023-12-18 LAB
ALBUMIN SERPL BCP-MCNC: 3.7 G/DL (ref 3.4–5)
ALP SERPL-CCNC: 33 U/L (ref 33–136)
ALT SERPL W P-5'-P-CCNC: 21 U/L (ref 10–52)
ANION GAP SERPL CALC-SCNC: 11 MMOL/L (ref 10–20)
AST SERPL W P-5'-P-CCNC: 29 U/L (ref 9–39)
BILIRUB SERPL-MCNC: 0.5 MG/DL (ref 0–1.2)
BUN SERPL-MCNC: 17 MG/DL (ref 6–23)
CALCIUM SERPL-MCNC: 8.9 MG/DL (ref 8.6–10.3)
CHLORIDE SERPL-SCNC: 112 MMOL/L (ref 98–107)
CO2 SERPL-SCNC: 23 MMOL/L (ref 21–32)
CREAT SERPL-MCNC: 1.09 MG/DL (ref 0.5–1.3)
ERYTHROCYTE [DISTWIDTH] IN BLOOD BY AUTOMATED COUNT: 14.8 % (ref 11.5–14.5)
GFR SERPL CREATININE-BSD FRML MDRD: 71 ML/MIN/1.73M*2
GLUCOSE SERPL-MCNC: 87 MG/DL (ref 74–99)
HCT VFR BLD AUTO: 36.3 % (ref 41–52)
HGB BLD-MCNC: 11.9 G/DL (ref 13.5–17.5)
MCH RBC QN AUTO: 32.2 PG (ref 26–34)
MCHC RBC AUTO-ENTMCNC: 32.8 G/DL (ref 32–36)
MCV RBC AUTO: 98 FL (ref 80–100)
NRBC BLD-RTO: 0 /100 WBCS (ref 0–0)
PLATELET # BLD AUTO: 182 X10*3/UL (ref 150–450)
POTASSIUM SERPL-SCNC: 4.2 MMOL/L (ref 3.5–5.3)
PROT SERPL-MCNC: 6.3 G/DL (ref 6.4–8.2)
RBC # BLD AUTO: 3.7 X10*6/UL (ref 4.5–5.9)
SODIUM SERPL-SCNC: 142 MMOL/L (ref 136–145)
WBC # BLD AUTO: 4.7 X10*3/UL (ref 4.4–11.3)

## 2023-12-18 PROCEDURE — 85027 COMPLETE CBC AUTOMATED: CPT | Mod: OUT | Performed by: INTERNAL MEDICINE

## 2023-12-18 PROCEDURE — 80053 COMPREHEN METABOLIC PANEL: CPT | Mod: OUT | Performed by: INTERNAL MEDICINE

## 2023-12-19 ENCOUNTER — LAB REQUISITION (OUTPATIENT)
Dept: LAB | Facility: HOSPITAL | Age: 73
End: 2023-12-19
Payer: MEDICAID

## 2023-12-19 DIAGNOSIS — R32 UNSPECIFIED URINARY INCONTINENCE: ICD-10-CM

## 2023-12-19 LAB
APPEARANCE UR: ABNORMAL
BACTERIA #/AREA URNS AUTO: ABNORMAL /HPF
BILIRUB UR STRIP.AUTO-MCNC: NEGATIVE MG/DL
CAOX CRY #/AREA UR COMP ASSIST: ABNORMAL /HPF
COLOR UR: ABNORMAL
GLUCOSE UR STRIP.AUTO-MCNC: NEGATIVE MG/DL
KETONES UR STRIP.AUTO-MCNC: NEGATIVE MG/DL
LEUKOCYTE ESTERASE UR QL STRIP.AUTO: NEGATIVE
MUCOUS THREADS #/AREA URNS AUTO: ABNORMAL /LPF
NITRITE UR QL STRIP.AUTO: NEGATIVE
PH UR STRIP.AUTO: 5 [PH]
PROT UR STRIP.AUTO-MCNC: ABNORMAL MG/DL
RBC # UR STRIP.AUTO: ABNORMAL /UL
RBC #/AREA URNS AUTO: >20 /HPF
SP GR UR STRIP.AUTO: 1.03
UROBILINOGEN UR STRIP.AUTO-MCNC: 4 MG/DL
WBC #/AREA URNS AUTO: ABNORMAL /HPF

## 2023-12-19 PROCEDURE — 81001 URINALYSIS AUTO W/SCOPE: CPT | Mod: OUT | Performed by: INTERNAL MEDICINE

## 2023-12-19 PROCEDURE — 87086 URINE CULTURE/COLONY COUNT: CPT | Mod: OUT,PARLAB | Performed by: INTERNAL MEDICINE

## 2023-12-20 LAB — BACTERIA UR CULT: NO GROWTH

## 2024-01-09 ENCOUNTER — LAB REQUISITION (OUTPATIENT)
Dept: LAB | Facility: HOSPITAL | Age: 74
End: 2024-01-09
Payer: MEDICAID

## 2024-01-09 DIAGNOSIS — I10 ESSENTIAL (PRIMARY) HYPERTENSION: ICD-10-CM

## 2024-01-09 DIAGNOSIS — N17.9 ACUTE KIDNEY FAILURE, UNSPECIFIED (CMS-HCC): ICD-10-CM

## 2024-01-09 LAB
APPEARANCE UR: CLEAR
BILIRUB UR STRIP.AUTO-MCNC: NEGATIVE MG/DL
COLOR UR: YELLOW
GLUCOSE UR STRIP.AUTO-MCNC: NEGATIVE MG/DL
KETONES UR STRIP.AUTO-MCNC: NEGATIVE MG/DL
LEUKOCYTE ESTERASE UR QL STRIP.AUTO: NEGATIVE
MUCOUS THREADS #/AREA URNS AUTO: ABNORMAL /LPF
NITRITE UR QL STRIP.AUTO: NEGATIVE
PH UR STRIP.AUTO: 5 [PH]
PROT UR STRIP.AUTO-MCNC: NEGATIVE MG/DL
RBC # UR STRIP.AUTO: ABNORMAL /UL
RBC #/AREA URNS AUTO: ABNORMAL /HPF
SP GR UR STRIP.AUTO: 1.03
UROBILINOGEN UR STRIP.AUTO-MCNC: <2 MG/DL
WBC #/AREA URNS AUTO: ABNORMAL /HPF

## 2024-01-09 PROCEDURE — 87086 URINE CULTURE/COLONY COUNT: CPT | Mod: OUT,PARLAB | Performed by: INTERNAL MEDICINE

## 2024-01-09 PROCEDURE — 81001 URINALYSIS AUTO W/SCOPE: CPT | Mod: OUT | Performed by: INTERNAL MEDICINE

## 2024-01-10 LAB — BACTERIA UR CULT: NO GROWTH

## 2024-04-09 ENCOUNTER — LAB REQUISITION (OUTPATIENT)
Dept: LAB | Facility: HOSPITAL | Age: 74
End: 2024-04-09
Payer: MEDICAID

## 2024-04-09 DIAGNOSIS — E55.9 VITAMIN D DEFICIENCY, UNSPECIFIED: ICD-10-CM

## 2024-04-09 DIAGNOSIS — J44.9 CHRONIC OBSTRUCTIVE PULMONARY DISEASE, UNSPECIFIED (MULTI): ICD-10-CM

## 2024-04-09 DIAGNOSIS — F03.90 UNSPECIFIED DEMENTIA, UNSPECIFIED SEVERITY, WITHOUT BEHAVIORAL DISTURBANCE, PSYCHOTIC DISTURBANCE, MOOD DISTURBANCE, AND ANXIETY (MULTI): ICD-10-CM

## 2024-04-09 LAB
25(OH)D3 SERPL-MCNC: 47 NG/ML (ref 30–100)
ALBUMIN SERPL BCP-MCNC: 3.7 G/DL (ref 3.4–5)
ALP SERPL-CCNC: 34 U/L (ref 33–136)
ALT SERPL W P-5'-P-CCNC: 10 U/L (ref 10–52)
ANION GAP SERPL CALC-SCNC: 12 MMOL/L (ref 10–20)
AST SERPL W P-5'-P-CCNC: 15 U/L (ref 9–39)
BILIRUB SERPL-MCNC: 0.5 MG/DL (ref 0–1.2)
BUN SERPL-MCNC: 16 MG/DL (ref 6–23)
CALCIUM SERPL-MCNC: 8.7 MG/DL (ref 8.6–10.3)
CHLORIDE SERPL-SCNC: 107 MMOL/L (ref 98–107)
CHOLEST SERPL-MCNC: 102 MG/DL (ref 0–199)
CHOLESTEROL/HDL RATIO: 2.9
CO2 SERPL-SCNC: 27 MMOL/L (ref 21–32)
CREAT SERPL-MCNC: 1.12 MG/DL (ref 0.5–1.3)
EGFRCR SERPLBLD CKD-EPI 2021: 68 ML/MIN/1.73M*2
ERYTHROCYTE [DISTWIDTH] IN BLOOD BY AUTOMATED COUNT: 16.4 % (ref 11.5–14.5)
GLUCOSE SERPL-MCNC: 105 MG/DL (ref 74–99)
HCT VFR BLD AUTO: 33.4 % (ref 41–52)
HDLC SERPL-MCNC: 34.6 MG/DL
HGB BLD-MCNC: 10.8 G/DL (ref 13.5–17.5)
LDLC SERPL CALC-MCNC: 56 MG/DL
MCH RBC QN AUTO: 31 PG (ref 26–34)
MCHC RBC AUTO-ENTMCNC: 32.3 G/DL (ref 32–36)
MCV RBC AUTO: 96 FL (ref 80–100)
NON HDL CHOLESTEROL: 67 MG/DL (ref 0–149)
NRBC BLD-RTO: 0 /100 WBCS (ref 0–0)
PLATELET # BLD AUTO: 208 X10*3/UL (ref 150–450)
POTASSIUM SERPL-SCNC: 3.9 MMOL/L (ref 3.5–5.3)
PROT SERPL-MCNC: 5.8 G/DL (ref 6.4–8.2)
RBC # BLD AUTO: 3.48 X10*6/UL (ref 4.5–5.9)
SODIUM SERPL-SCNC: 142 MMOL/L (ref 136–145)
TRIGL SERPL-MCNC: 55 MG/DL (ref 0–149)
VALPROATE SERPL-MCNC: 72 UG/ML (ref 50–100)
VLDL: 11 MG/DL (ref 0–40)
WBC # BLD AUTO: 4.6 X10*3/UL (ref 4.4–11.3)

## 2024-04-09 PROCEDURE — 80053 COMPREHEN METABOLIC PANEL: CPT | Mod: OUT | Performed by: INTERNAL MEDICINE

## 2024-04-09 PROCEDURE — 80061 LIPID PANEL: CPT | Mod: OUT | Performed by: INTERNAL MEDICINE

## 2024-04-09 PROCEDURE — 85027 COMPLETE CBC AUTOMATED: CPT | Mod: OUT | Performed by: INTERNAL MEDICINE

## 2024-04-09 PROCEDURE — 80164 ASSAY DIPROPYLACETIC ACD TOT: CPT | Mod: OUT | Performed by: INTERNAL MEDICINE

## 2024-04-09 PROCEDURE — 82306 VITAMIN D 25 HYDROXY: CPT | Mod: OUT,PARLAB | Performed by: INTERNAL MEDICINE

## 2024-04-17 ENCOUNTER — APPOINTMENT (OUTPATIENT)
Dept: RADIOLOGY | Facility: HOSPITAL | Age: 74
End: 2024-04-17
Payer: MEDICAID

## 2024-04-17 ENCOUNTER — HOSPITAL ENCOUNTER (INPATIENT)
Facility: HOSPITAL | Age: 74
LOS: 3 days | Discharge: NURSING, REHAB OR LONG-TERM CARE | End: 2024-04-21
Attending: EMERGENCY MEDICINE | Admitting: INTERNAL MEDICINE
Payer: MEDICAID

## 2024-04-17 DIAGNOSIS — N17.9 AKI (ACUTE KIDNEY INJURY) (CMS-HCC): ICD-10-CM

## 2024-04-17 DIAGNOSIS — R55 SYNCOPE, UNSPECIFIED SYNCOPE TYPE: Primary | ICD-10-CM

## 2024-04-17 DIAGNOSIS — I95.9 HYPOTENSION, UNSPECIFIED HYPOTENSION TYPE: ICD-10-CM

## 2024-04-17 PROCEDURE — 99291 CRITICAL CARE FIRST HOUR: CPT | Performed by: EMERGENCY MEDICINE

## 2024-04-17 PROCEDURE — 96360 HYDRATION IV INFUSION INIT: CPT

## 2024-04-17 PROCEDURE — 96361 HYDRATE IV INFUSION ADD-ON: CPT

## 2024-04-17 PROCEDURE — 71046 X-RAY EXAM CHEST 2 VIEWS: CPT

## 2024-04-17 PROCEDURE — 99285 EMERGENCY DEPT VISIT HI MDM: CPT | Mod: 25

## 2024-04-17 ASSESSMENT — COLUMBIA-SUICIDE SEVERITY RATING SCALE - C-SSRS
6. HAVE YOU EVER DONE ANYTHING, STARTED TO DO ANYTHING, OR PREPARED TO DO ANYTHING TO END YOUR LIFE?: NO
1. IN THE PAST MONTH, HAVE YOU WISHED YOU WERE DEAD OR WISHED YOU COULD GO TO SLEEP AND NOT WAKE UP?: NO
2. HAVE YOU ACTUALLY HAD ANY THOUGHTS OF KILLING YOURSELF?: NO

## 2024-04-17 ASSESSMENT — PAIN - FUNCTIONAL ASSESSMENT: PAIN_FUNCTIONAL_ASSESSMENT: 0-10

## 2024-04-17 ASSESSMENT — PAIN SCALES - GENERAL: PAINLEVEL_OUTOF10: 0 - NO PAIN

## 2024-04-18 ENCOUNTER — APPOINTMENT (OUTPATIENT)
Dept: RADIOLOGY | Facility: HOSPITAL | Age: 74
End: 2024-04-18
Payer: MEDICAID

## 2024-04-18 ENCOUNTER — APPOINTMENT (OUTPATIENT)
Dept: CARDIOLOGY | Facility: HOSPITAL | Age: 74
End: 2024-04-18
Payer: MEDICAID

## 2024-04-18 PROBLEM — R55 SYNCOPE, UNSPECIFIED SYNCOPE TYPE: Status: ACTIVE | Noted: 2024-04-18

## 2024-04-18 PROBLEM — R55 SYNCOPE: Status: ACTIVE | Noted: 2023-03-18

## 2024-04-18 PROBLEM — Z86.16 PERSONAL HISTORY OF COVID-19: Status: ACTIVE | Noted: 2023-03-21

## 2024-04-18 LAB
ALBUMIN SERPL BCP-MCNC: 3.7 G/DL (ref 3.4–5)
ALP SERPL-CCNC: 35 U/L (ref 33–136)
ALT SERPL W P-5'-P-CCNC: 9 U/L (ref 10–52)
ANION GAP SERPL CALC-SCNC: 12 MMOL/L (ref 10–20)
APPEARANCE UR: ABNORMAL
APTT PPP: 25 SECONDS (ref 27–38)
AST SERPL W P-5'-P-CCNC: 12 U/L (ref 9–39)
BASOPHILS # BLD AUTO: 0.02 X10*3/UL (ref 0–0.1)
BASOPHILS NFR BLD AUTO: 0.3 %
BILIRUB SERPL-MCNC: 0.5 MG/DL (ref 0–1.2)
BILIRUB UR STRIP.AUTO-MCNC: NEGATIVE MG/DL
BUN SERPL-MCNC: 17 MG/DL (ref 6–23)
CALCIUM SERPL-MCNC: 8.9 MG/DL (ref 8.6–10.3)
CARDIAC TROPONIN I PNL SERPL HS: 10 NG/L (ref 0–20)
CARDIAC TROPONIN I PNL SERPL HS: 8 NG/L (ref 0–20)
CHLORIDE SERPL-SCNC: 108 MMOL/L (ref 98–107)
CO2 SERPL-SCNC: 25 MMOL/L (ref 21–32)
COLOR UR: ABNORMAL
CREAT SERPL-MCNC: 2.03 MG/DL (ref 0.5–1.3)
EGFRCR SERPLBLD CKD-EPI 2021: 33 ML/MIN/1.73M*2
EOSINOPHIL # BLD AUTO: 0.1 X10*3/UL (ref 0–0.4)
EOSINOPHIL NFR BLD AUTO: 1.4 %
ERYTHROCYTE [DISTWIDTH] IN BLOOD BY AUTOMATED COUNT: 16.3 % (ref 11.5–14.5)
GLUCOSE SERPL-MCNC: 155 MG/DL (ref 74–99)
GLUCOSE UR STRIP.AUTO-MCNC: NEGATIVE MG/DL
HCT VFR BLD AUTO: 32.5 % (ref 41–52)
HGB BLD-MCNC: 10.8 G/DL (ref 13.5–17.5)
HYALINE CASTS #/AREA URNS AUTO: ABNORMAL /LPF
IMM GRANULOCYTES # BLD AUTO: 0.04 X10*3/UL (ref 0–0.5)
IMM GRANULOCYTES NFR BLD AUTO: 0.6 % (ref 0–0.9)
INR PPP: 0.9 (ref 0.9–1.1)
KETONES UR STRIP.AUTO-MCNC: ABNORMAL MG/DL
LACTATE SERPL-SCNC: 2 MMOL/L (ref 0.4–2)
LEUKOCYTE ESTERASE UR QL STRIP.AUTO: NEGATIVE
LYMPHOCYTES # BLD AUTO: 2.06 X10*3/UL (ref 0.8–3)
LYMPHOCYTES NFR BLD AUTO: 28.5 %
MAGNESIUM SERPL-MCNC: 1.88 MG/DL (ref 1.6–2.4)
MCH RBC QN AUTO: 31.6 PG (ref 26–34)
MCHC RBC AUTO-ENTMCNC: 33.2 G/DL (ref 32–36)
MCV RBC AUTO: 95 FL (ref 80–100)
MONOCYTES # BLD AUTO: 0.61 X10*3/UL (ref 0.05–0.8)
MONOCYTES NFR BLD AUTO: 8.4 %
MUCOUS THREADS #/AREA URNS AUTO: ABNORMAL /LPF
NEUTROPHILS # BLD AUTO: 4.41 X10*3/UL (ref 1.6–5.5)
NEUTROPHILS NFR BLD AUTO: 60.8 %
NITRITE UR QL STRIP.AUTO: NEGATIVE
NRBC BLD-RTO: 0 /100 WBCS (ref 0–0)
PH UR STRIP.AUTO: 5 [PH]
PLATELET # BLD AUTO: 205 X10*3/UL (ref 150–450)
POTASSIUM SERPL-SCNC: 4.3 MMOL/L (ref 3.5–5.3)
PROT SERPL-MCNC: 6.3 G/DL (ref 6.4–8.2)
PROT UR STRIP.AUTO-MCNC: ABNORMAL MG/DL
PROTHROMBIN TIME: 10.4 SECONDS (ref 9.8–12.8)
RBC # BLD AUTO: 3.42 X10*6/UL (ref 4.5–5.9)
RBC # UR STRIP.AUTO: NEGATIVE /UL
RBC #/AREA URNS AUTO: ABNORMAL /HPF
SODIUM SERPL-SCNC: 141 MMOL/L (ref 136–145)
SP GR UR STRIP.AUTO: 1.02
UROBILINOGEN UR STRIP.AUTO-MCNC: 4 MG/DL
WBC # BLD AUTO: 7.2 X10*3/UL (ref 4.4–11.3)
WBC #/AREA URNS AUTO: ABNORMAL /HPF

## 2024-04-18 PROCEDURE — 2500000004 HC RX 250 GENERAL PHARMACY W/ HCPCS (ALT 636 FOR OP/ED): Performed by: NURSE PRACTITIONER

## 2024-04-18 PROCEDURE — 70450 CT HEAD/BRAIN W/O DYE: CPT

## 2024-04-18 PROCEDURE — 36415 COLL VENOUS BLD VENIPUNCTURE: CPT | Performed by: NURSE PRACTITIONER

## 2024-04-18 PROCEDURE — 96361 HYDRATE IV INFUSION ADD-ON: CPT

## 2024-04-18 PROCEDURE — 83605 ASSAY OF LACTIC ACID: CPT | Performed by: NURSE PRACTITIONER

## 2024-04-18 PROCEDURE — 84484 ASSAY OF TROPONIN QUANT: CPT | Performed by: NURSE PRACTITIONER

## 2024-04-18 PROCEDURE — 80053 COMPREHEN METABOLIC PANEL: CPT | Performed by: NURSE PRACTITIONER

## 2024-04-18 PROCEDURE — 85610 PROTHROMBIN TIME: CPT | Performed by: NURSE PRACTITIONER

## 2024-04-18 PROCEDURE — 71046 X-RAY EXAM CHEST 2 VIEWS: CPT | Mod: FOREIGN READ | Performed by: RADIOLOGY

## 2024-04-18 PROCEDURE — 85025 COMPLETE CBC W/AUTO DIFF WBC: CPT | Performed by: NURSE PRACTITIONER

## 2024-04-18 PROCEDURE — 97165 OT EVAL LOW COMPLEX 30 MIN: CPT | Mod: GO

## 2024-04-18 PROCEDURE — 96360 HYDRATION IV INFUSION INIT: CPT | Mod: 59

## 2024-04-18 PROCEDURE — 99222 1ST HOSP IP/OBS MODERATE 55: CPT | Performed by: NURSE PRACTITIONER

## 2024-04-18 PROCEDURE — 70450 CT HEAD/BRAIN W/O DYE: CPT | Performed by: STUDENT IN AN ORGANIZED HEALTH CARE EDUCATION/TRAINING PROGRAM

## 2024-04-18 PROCEDURE — 2060000001 HC INTERMEDIATE ICU ROOM DAILY

## 2024-04-18 PROCEDURE — 2500000001 HC RX 250 WO HCPCS SELF ADMINISTERED DRUGS (ALT 637 FOR MEDICARE OP): Performed by: NURSE PRACTITIONER

## 2024-04-18 PROCEDURE — 97161 PT EVAL LOW COMPLEX 20 MIN: CPT | Mod: GP

## 2024-04-18 PROCEDURE — 93005 ELECTROCARDIOGRAM TRACING: CPT

## 2024-04-18 PROCEDURE — 81001 URINALYSIS AUTO W/SCOPE: CPT | Performed by: NURSE PRACTITIONER

## 2024-04-18 PROCEDURE — 2500000006 HC RX 250 W HCPCS SELF ADMINISTERED DRUGS (ALT 637 FOR ALL PAYERS): Performed by: NURSE PRACTITIONER

## 2024-04-18 PROCEDURE — 83735 ASSAY OF MAGNESIUM: CPT | Performed by: NURSE PRACTITIONER

## 2024-04-18 RX ORDER — ASPIRIN 81 MG/1
81 TABLET ORAL DAILY
Status: DISCONTINUED | OUTPATIENT
Start: 2024-04-18 | End: 2024-04-21 | Stop reason: HOSPADM

## 2024-04-18 RX ORDER — ESCITALOPRAM OXALATE 10 MG/1
10 TABLET ORAL DAILY
Status: DISCONTINUED | OUTPATIENT
Start: 2024-04-18 | End: 2024-04-21 | Stop reason: HOSPADM

## 2024-04-18 RX ORDER — ACETAMINOPHEN 160 MG/5ML
650 SOLUTION ORAL EVERY 4 HOURS PRN
Status: DISCONTINUED | OUTPATIENT
Start: 2024-04-18 | End: 2024-04-21 | Stop reason: HOSPADM

## 2024-04-18 RX ORDER — METOPROLOL TARTRATE 25 MG/1
25 TABLET, FILM COATED ORAL 2 TIMES DAILY
Status: DISCONTINUED | OUTPATIENT
Start: 2024-04-18 | End: 2024-04-21 | Stop reason: HOSPADM

## 2024-04-18 RX ORDER — ACETAMINOPHEN 650 MG/1
650 SUPPOSITORY RECTAL EVERY 4 HOURS PRN
Status: DISCONTINUED | OUTPATIENT
Start: 2024-04-18 | End: 2024-04-21 | Stop reason: HOSPADM

## 2024-04-18 RX ORDER — PANTOPRAZOLE SODIUM 20 MG/1
20 TABLET, DELAYED RELEASE ORAL DAILY
Status: DISCONTINUED | OUTPATIENT
Start: 2024-04-18 | End: 2024-04-21 | Stop reason: HOSPADM

## 2024-04-18 RX ORDER — CLOPIDOGREL BISULFATE 75 MG/1
75 TABLET ORAL DAILY
Status: DISCONTINUED | OUTPATIENT
Start: 2024-04-18 | End: 2024-04-21 | Stop reason: HOSPADM

## 2024-04-18 RX ORDER — ATORVASTATIN CALCIUM 80 MG/1
80 TABLET, FILM COATED ORAL NIGHTLY
Status: DISCONTINUED | OUTPATIENT
Start: 2024-04-18 | End: 2024-04-21 | Stop reason: HOSPADM

## 2024-04-18 RX ORDER — ISOSORBIDE MONONITRATE 30 MG/1
30 TABLET, EXTENDED RELEASE ORAL DAILY
Status: DISCONTINUED | OUTPATIENT
Start: 2024-04-18 | End: 2024-04-21 | Stop reason: HOSPADM

## 2024-04-18 RX ORDER — DONEPEZIL HYDROCHLORIDE 5 MG/1
5 TABLET, FILM COATED ORAL NIGHTLY
Status: DISCONTINUED | OUTPATIENT
Start: 2024-04-18 | End: 2024-04-21 | Stop reason: HOSPADM

## 2024-04-18 RX ORDER — L. ACIDOPHILUS/L.BULGARICUS 1MM CELL
1 TABLET ORAL DAILY
Status: DISCONTINUED | OUTPATIENT
Start: 2024-04-18 | End: 2024-04-21 | Stop reason: HOSPADM

## 2024-04-18 RX ORDER — DIVALPROEX SODIUM 125 MG/1
500 CAPSULE, COATED PELLETS ORAL 2 TIMES DAILY
Status: DISCONTINUED | OUTPATIENT
Start: 2024-04-18 | End: 2024-04-21 | Stop reason: HOSPADM

## 2024-04-18 RX ORDER — SODIUM CHLORIDE, SODIUM LACTATE, POTASSIUM CHLORIDE, CALCIUM CHLORIDE 600; 310; 30; 20 MG/100ML; MG/100ML; MG/100ML; MG/100ML
50 INJECTION, SOLUTION INTRAVENOUS CONTINUOUS
Status: ACTIVE | OUTPATIENT
Start: 2024-04-18 | End: 2024-04-19

## 2024-04-18 RX ORDER — ACETAMINOPHEN 325 MG/1
650 TABLET ORAL EVERY 4 HOURS PRN
Status: DISCONTINUED | OUTPATIENT
Start: 2024-04-18 | End: 2024-04-21 | Stop reason: HOSPADM

## 2024-04-18 RX ADMIN — ACETAMINOPHEN 650 MG: 325 TABLET ORAL at 21:51

## 2024-04-18 RX ADMIN — SODIUM CHLORIDE 1000 ML: 9 INJECTION, SOLUTION INTRAVENOUS at 00:24

## 2024-04-18 RX ADMIN — PANTOPRAZOLE SODIUM 20 MG: 20 TABLET, DELAYED RELEASE ORAL at 09:36

## 2024-04-18 RX ADMIN — SODIUM CHLORIDE, POTASSIUM CHLORIDE, SODIUM LACTATE AND CALCIUM CHLORIDE 50 ML/HR: 600; 310; 30; 20 INJECTION, SOLUTION INTRAVENOUS at 21:51

## 2024-04-18 RX ADMIN — Medication 1 TABLET: at 09:36

## 2024-04-18 RX ADMIN — DONEPEZIL HYDROCHLORIDE 5 MG: 5 TABLET, FILM COATED ORAL at 21:50

## 2024-04-18 RX ADMIN — CLOPIDOGREL BISULFATE 75 MG: 75 TABLET ORAL at 09:36

## 2024-04-18 RX ADMIN — DIVALPROEX SODIUM 500 MG: 125 CAPSULE, COATED PELLETS ORAL at 09:47

## 2024-04-18 RX ADMIN — SODIUM CHLORIDE, POTASSIUM CHLORIDE, SODIUM LACTATE AND CALCIUM CHLORIDE 50 ML/HR: 600; 310; 30; 20 INJECTION, SOLUTION INTRAVENOUS at 06:01

## 2024-04-18 RX ADMIN — ASPIRIN 81 MG: 81 TABLET, COATED ORAL at 09:36

## 2024-04-18 RX ADMIN — ESCITALOPRAM OXALATE 10 MG: 10 TABLET ORAL at 09:36

## 2024-04-18 RX ADMIN — SODIUM CHLORIDE 1000 ML: 9 INJECTION, SOLUTION INTRAVENOUS at 03:16

## 2024-04-18 RX ADMIN — ISOSORBIDE MONONITRATE 30 MG: 30 TABLET, EXTENDED RELEASE ORAL at 09:36

## 2024-04-18 RX ADMIN — ATORVASTATIN CALCIUM 80 MG: 80 TABLET, FILM COATED ORAL at 21:50

## 2024-04-18 RX ADMIN — DIVALPROEX SODIUM 500 MG: 125 CAPSULE, COATED PELLETS ORAL at 21:50

## 2024-04-18 SDOH — SOCIAL STABILITY: SOCIAL INSECURITY: DO YOU FEEL ANYONE HAS EXPLOITED OR TAKEN ADVANTAGE OF YOU FINANCIALLY OR OF YOUR PERSONAL PROPERTY?: UNABLE TO ASSESS

## 2024-04-18 SDOH — SOCIAL STABILITY: SOCIAL INSECURITY: HAVE YOU HAD ANY THOUGHTS OF HARMING ANYONE ELSE?: UNABLE TO ASSESS

## 2024-04-18 SDOH — SOCIAL STABILITY: SOCIAL INSECURITY: HAVE YOU HAD THOUGHTS OF HARMING ANYONE ELSE?: UNABLE TO ASSESS

## 2024-04-18 SDOH — SOCIAL STABILITY: SOCIAL INSECURITY: WERE YOU ABLE TO COMPLETE ALL THE BEHAVIORAL HEALTH SCREENINGS?: YES

## 2024-04-18 SDOH — SOCIAL STABILITY: SOCIAL INSECURITY: HAS ANYONE EVER THREATENED TO HURT YOUR FAMILY OR YOUR PETS?: UNABLE TO ASSESS

## 2024-04-18 SDOH — SOCIAL STABILITY: SOCIAL INSECURITY: ARE YOU OR HAVE YOU BEEN THREATENED OR ABUSED PHYSICALLY, EMOTIONALLY, OR SEXUALLY BY ANYONE?: UNABLE TO ASSESS

## 2024-04-18 SDOH — SOCIAL STABILITY: SOCIAL INSECURITY: DOES ANYONE TRY TO KEEP YOU FROM HAVING/CONTACTING OTHER FRIENDS OR DOING THINGS OUTSIDE YOUR HOME?: UNABLE TO ASSESS

## 2024-04-18 SDOH — SOCIAL STABILITY: SOCIAL INSECURITY: ABUSE: ADULT

## 2024-04-18 SDOH — SOCIAL STABILITY: SOCIAL INSECURITY: ARE THERE ANY APPARENT SIGNS OF INJURIES/BEHAVIORS THAT COULD BE RELATED TO ABUSE/NEGLECT?: UNABLE TO ASSESS

## 2024-04-18 SDOH — SOCIAL STABILITY: SOCIAL INSECURITY: DO YOU FEEL UNSAFE GOING BACK TO THE PLACE WHERE YOU ARE LIVING?: UNABLE TO ASSESS

## 2024-04-18 ASSESSMENT — COGNITIVE AND FUNCTIONAL STATUS - GENERAL
MOBILITY SCORE: 10
PERSONAL GROOMING: A LOT
MOVING FROM LYING ON BACK TO SITTING ON SIDE OF FLAT BED WITH BEDRAILS: A LOT
DRESSING REGULAR UPPER BODY CLOTHING: A LOT
HELP NEEDED FOR BATHING: TOTAL
DRESSING REGULAR LOWER BODY CLOTHING: A LOT
STANDING UP FROM CHAIR USING ARMS: A LOT
PERSONAL GROOMING: A LOT
STANDING UP FROM CHAIR USING ARMS: A LOT
HELP NEEDED FOR BATHING: TOTAL
CLIMB 3 TO 5 STEPS WITH RAILING: TOTAL
WALKING IN HOSPITAL ROOM: TOTAL
DAILY ACTIVITIY SCORE: 10
MOBILITY SCORE: 10
WALKING IN HOSPITAL ROOM: TOTAL
TURNING FROM BACK TO SIDE WHILE IN FLAT BAD: A LOT
MOVING FROM LYING ON BACK TO SITTING ON SIDE OF FLAT BED WITH BEDRAILS: A LOT
PATIENT BASELINE BEDBOUND: NO
EATING MEALS: A LITTLE
DRESSING REGULAR UPPER BODY CLOTHING: A LOT
EATING MEALS: A LITTLE
TOILETING: TOTAL
MOVING TO AND FROM BED TO CHAIR: A LOT
TOILETING: A LOT
CLIMB 3 TO 5 STEPS WITH RAILING: TOTAL
DAILY ACTIVITIY SCORE: 12
TURNING FROM BACK TO SIDE WHILE IN FLAT BAD: A LOT
MOVING TO AND FROM BED TO CHAIR: A LOT
DRESSING REGULAR LOWER BODY CLOTHING: TOTAL

## 2024-04-18 ASSESSMENT — ACTIVITIES OF DAILY LIVING (ADL)
WALKS IN HOME: NEEDS ASSISTANCE
BATHING: NEEDS ASSISTANCE
HEARING - RIGHT EAR: FUNCTIONAL
PATIENT'S MEMORY ADEQUATE TO SAFELY COMPLETE DAILY ACTIVITIES?: NO
DRESSING YOURSELF: NEEDS ASSISTANCE
LACK_OF_TRANSPORTATION: PATIENT UNABLE TO ANSWER
GROOMING: NEEDS ASSISTANCE
JUDGMENT_ADEQUATE_SAFELY_COMPLETE_DAILY_ACTIVITIES: NO
TOILETING: NEEDS ASSISTANCE
ASSISTIVE_DEVICE: WHEELCHAIR;WALKER
HEARING - LEFT EAR: FUNCTIONAL
ADEQUATE_TO_COMPLETE_ADL: NO
FEEDING YOURSELF: NEEDS ASSISTANCE

## 2024-04-18 ASSESSMENT — LIFESTYLE VARIABLES
HOW OFTEN DO YOU HAVE A DRINK CONTAINING ALCOHOL: PATIENT UNABLE TO ANSWER
AUDIT-C TOTAL SCORE: -1
HOW MANY STANDARD DRINKS CONTAINING ALCOHOL DO YOU HAVE ON A TYPICAL DAY: PATIENT UNABLE TO ANSWER
AUDIT-C TOTAL SCORE: -1
SKIP TO QUESTIONS 9-10: 0
HOW OFTEN DO YOU HAVE 6 OR MORE DRINKS ON ONE OCCASION: PATIENT UNABLE TO ANSWER

## 2024-04-18 ASSESSMENT — PAIN - FUNCTIONAL ASSESSMENT
PAIN_FUNCTIONAL_ASSESSMENT: 0-10
PAIN_FUNCTIONAL_ASSESSMENT: 0-10

## 2024-04-18 ASSESSMENT — PAIN DESCRIPTION - ORIENTATION: ORIENTATION: RIGHT

## 2024-04-18 ASSESSMENT — PATIENT HEALTH QUESTIONNAIRE - PHQ9
2. FEELING DOWN, DEPRESSED OR HOPELESS: NOT AT ALL
1. LITTLE INTEREST OR PLEASURE IN DOING THINGS: NOT AT ALL
SUM OF ALL RESPONSES TO PHQ9 QUESTIONS 1 & 2: 0

## 2024-04-18 ASSESSMENT — PAIN DESCRIPTION - LOCATION: LOCATION: LEG

## 2024-04-18 ASSESSMENT — PAIN SCALES - GENERAL
PAINLEVEL_OUTOF10: 0 - NO PAIN
PAINLEVEL_OUTOF10: 0 - NO PAIN

## 2024-04-18 NOTE — PROGRESS NOTES
Physical Therapy    Physical Therapy Evaluation    Patient Name: Reza Lockett  MRN: [id number]  Today's Date: 4/18/2024   Time Calculation  Start Time: 0814  Stop Time: 0826  Time Calculation (min): 12 min    Assessment/Plan   PT Assessment  PT Assessment Results: Decreased strength, Decreased endurance, Impaired balance, Decreased mobility, Decreased safety awareness, Pain  Rehab Prognosis: Good  Evaluation/Treatment Tolerance: Patient limited by pain, Patient limited by fatigue  End of Session Communication: Bedside nurse  Assessment Comment: Continued skilled PT intervention indicated to facilitate increased strength, balance & gait stability  End of Session Patient Position: Bed, 2 rail up, Alarm on (hob elevated, call light in reach, all needs met)  IP OR SWING BED PT PLAN  Inpatient or Swing Bed: Inpatient  PT Plan  Treatment/Interventions: Bed mobility, Transfer training, Gait training, Balance training, Therapeutic exercise, Therapeutic activity  PT Plan: Skilled PT  PT Frequency: 3 times per week  PT Discharge Recommendations: Moderate intensity level of continued care  PT - OK to Discharge: Yes (to next level of care when cleared by medical team)    Subjective     Current Problem:  Patient Active Problem List   Diagnosis    Dementia without behavioral disturbance (Multi)    Non-ST elevation myocardial infarction (NSTEMI) in recovery phase (Multi)    Coronary artery disease involving native coronary artery of native heart without angina pectoris    Personal history of COVID-19    Syncope    Syncope, unspecified syncope type       General Visit Information:  General  Reason for Referral: PT eval & treat/impaired mobility DX: syncope, hypotension, DYLON;  4/17/24 pt w/ syncopal episode while on the commode; cth (-), cxr (-) acute  Referred By: Siena Isidro NP  Past Medical History Relevant to Rehab: dementia, chronic resp failure, COPD, depression, HTN, OA, GERD, and urinary incontinence.  Caregiver  Feedback: Per conference w/ RN patient stable to participate in therapy  Co-Treatment: OT  Co-Treatment Reason: to maximize pt safety& mobility  Patient Position Received: Bed, 2 rail up, Alarm on  General Comment: Pleasant & cooperative, slow labored speech, oriented to self, place/hospital on [place] side but unsure which one, & month; anxious w/ mobility, limited tolerance to upright actiivty. note lue > rue tremors    Home Living:  Home Living  Home Living Comments: Admitted from nursing facility, pt reports receiving therapy services    Prior Level of Function:  Prior Function Per Pt/Caregiver Report  Prior Function Comments: patient reports 1-2 assist for mobility & adl's/dressing & showering, reports wearing a brief    Precautions:  Precautions  Precautions Comment: fall, alarm, IV  Pain:  Pain Assessment  Pain Assessment:  (reports ble pain,unable to rate)  General Assessments:         Static Sitting Balance  Static Sitting-Balance Support:  (sat edge of bed [id number], initially max assist progressed to cga after cues for forward wt shift & midline positioning, tends to lean rt & posteriorly)       Functional Assessments:     Bed Mobility  Bed Mobility:  (max assist x2 supine<>sit, resists mvmt to edge of bed, indicates fear of falling)  Transfers  Transfer:  (patient admantly declined, wanting back to bed indicates fatigue, le pain& fear of falling)   Extremity/Trunk Assessments:        RLE   RLE :  (arom wfl for basic functional mobility, strength 2+/5 w/ antigravity mvmt)  LLE   LLE :  (arom wfl for basic functional mobility, strength 2+/5 w/ antigravity mvmt)    Outcome Measures:  [facility] Basic Mobility  Turning from your back to your side while in a flat bed without using bedrails: A lot  Moving from lying on your back to sitting on the side of a flat bed without using bedrails: A lot  Moving to and from bed to chair (including a wheelchair): A lot  Standing up from a chair using your arms (e.g. wheelchair  or bedside chair): A lot  To walk in hospital room: Total  Climbing 3-5 steps with railing: Total  Basic Mobility - Total Score: 10   Goals:  Encounter Problems       Encounter Problems (Active)       PT Problem       bed mobility   (Progressing)       Start:  04/18/24    Expected End:  05/02/24       Min assist x1-2 supine<>sit         transfers   (Progressing)       Start:  04/18/24    Expected End:  05/02/24       Min assist x1-2 sit<>stand using ww         gait   (Progressing)       Start:  04/18/24    Expected End:  05/02/24       Mod assist x1-2 w/ gait activity >=10ftx2 using ww         balance   (Progressing)       Start:  04/18/24    Expected End:  05/02/24       Sba maintaining sitting supported balance while performing >=10reps x2 sets ble therex to facilitate inc fxl mobility & gait stability               Education Documentation  Mobility Training, taught by Stacy Schroeder, PT at 4/18/2024  9:30 AM.  Learner: Patient  Readiness: Acceptance  Method: Explanation  Response: Verbalizes Understanding, Needs Reinforcement  Comment: safety, activity progression

## 2024-04-18 NOTE — PROGRESS NOTES
24 1313   Discharge Planning   Living Arrangements Other (Comment)  (LTC at [facility])   Support Systems Family members   Assistance Needed ADL's   Type of Residence Nursing home/residential care   Home or Post Acute Services Post acute facilities (Rehab/SNF/etc)   Type of Post Acute Facility Services Long term care   Patient expects to be discharged to: [facility]TC     Spoke with court appointed legal Guardian Marge on the phone. [phone]. Introduced self and role as care coordinator. Name, , Address verified. Confirmed patient is LTC at [facility]. Patient requires assistance with all ADL's. Patient is able to feed himself. Guardian confirms that patient will return to [facility] when medically ready. Care coordinators will follow for discharge planning.

## 2024-04-18 NOTE — ED TRIAGE NOTES
Pt is a 73 y/o male Pt that is a resident at an area nursing facility due to dementia, chronic resp failure, COPD, depression, HTN, OA, GERD, and urinary incontinence. Presents to ED this night for syncope x2. First occurrence was while he was on the toilet, second was while getting back into bed.    Pt is A/Ox2(norm) w/o CP SOB or n/v. 0/10 pain. Skin is war dry and intact pink membranes. Pt recalls most events and follows most commands. Resp are equal and unlabored.

## 2024-04-18 NOTE — H&P
History Of Present Illness  Reza Lockett is a 74 y.o. male presenting to emergency department for evaluation of a syncopal episode.  Patient presents from skilled nursing facility, brought in by EMS after having a syncopal episode on the toilet.  Patient states he was having a bowel movement when he became dizzy, syncopized, woke up, and syncopized again.  Patient sent in for evaluation.  Patient is alert and oriented to self, denies trauma or fall, headache, lightheadedness, dizziness, chest pain, shortness of breath, recent illness, nausea, vomiting, diarrhea.    In ED, hemoglobin 10.8, hematocrit 32.5, glucose 155, chloride 108, creatinine 2.03, GFR 33, ALT 9.  Troponin negative.  Blood pressure 109/59, heart rate 75, respirations 21, temperature 36.7 °C, SpO2 96% on room air.  CT of the head completed and negative for acute process per radiology review.  Chest x-ray completed and results pending.  Patient given IV fluids, negative for UTI.  Admitted to telemetry observation under the care of Dr. Feliz who will continue to follow.  I was asked to do H&P and place initial admission orders.     Past Medical History  Dementia, NSTEMI, EtOH abuse, seizures, pneumonia, chest tube s/p pneumothorax 2/2 mechanical ventilation after being diagnosed with COVID    Surgical History  Chest tube, hernia repair     Social History  Never smoker, no drug use, no alcohol use    Family History  Reviewed and noncontributory     Allergies  Patient has no known allergies.    Review of Systems   Unable to perform ROS: Dementia      Physical Exam  HENT:      Mouth/Throat:      Mouth: Mucous membranes are dry.      Pharynx: Oropharynx is clear.   Eyes:      Pupils: Pupils are equal, round, and reactive to light.   Cardiovascular:      Rate and Rhythm: Normal rate and regular rhythm.   Pulmonary:      Effort: Pulmonary effort is normal.      Breath sounds: Normal breath sounds.   Abdominal:      General: Bowel sounds are normal.       "Palpations: Abdomen is soft.   Musculoskeletal:         General: Normal range of motion.      Cervical back: Normal range of motion.   Skin:     General: Skin is warm and dry.      Capillary Refill: Capillary refill takes less than 2 seconds.   Neurological:      General: No focal deficit present.      Mental Status: He is alert. Mental status is at baseline. He is disoriented.   Psychiatric:         Mood and Affect: Mood normal.         Behavior: Behavior normal.          Last Recorded Vitals  Blood pressure 119/66, pulse 78, temperature 36.1 °C (97 °F), temperature source Temporal, resp. rate 17, height 1.778 m (5' 10\"), weight 87.5 kg (192 lb 14.4 oz), SpO2 94%.    Relevant Results  CT head wo IV contrast    Result Date: 4/18/2024  Interpreted By:  Shubham Beltran, STUDY: CT HEAD WO IV CONTRAST;  4/18/2024 1:47 am   INDICATION: Signs/Symptoms:syncope.   COMPARISON: 10/09/2023.   ACCESSION NUMBER(S): [id number]   ORDERING CLINICIAN: ZURDO MCRAE   TECHNIQUE: Axial noncontrast CT images of the head.   FINDINGS: Gray-white matter differentiation is maintained. There are no extra-axial collections. No mass effect or midline shift. There is no hydrocephalus. There is generalized cerebral volume loss and associated ventricular and sulcal enlargement. Scattered periventricular hypodensities suggestive of mild chronic microvascular ischemic change..   HEMORRHAGE: No acute intracranial hemorrhage.   CALVARIUM: No depressed skull fracture.   EXTRACRANIAL SOFT TISSUES:. There is midline scalp vertex soft tissue thickening unchanged from prior.   PARANASAL SINUSES/MASTOIDS: The visualized paranasal sinuses are well aerated. Mastoid air cells are clear.   ORBITS: Grossly normal.       No acute intracranial abnormality.   Moderate generalized cerebral volume loss.   Signed by: Shubham Beltran 4/18/2024 2:10 AM Dictation workstation:   [id number]    XR chest 2 views    Result Date: 4/18/2024  STUDY: Chest Radiographs;  " 4/18/2024 at 12:07 AM INDICATION: Syncope. COMPARISON: XR chest 10/9/2023, CT chest 3/17/2023, XR chest 3/17/2023. ACCESSION NUMBER(S): [id number] ORDERING CLINICIAN: ZURDO MCRAE TECHNIQUE:  Frontal and lateral chest. FINDINGS: CARDIOMEDIASTINAL SILHOUETTE: Cardiomediastinal silhouette is normal in size and configuration.  LUNGS: Lungs are clear.  There is slightly diminished inspiration.  ABDOMEN: No remarkable upper abdominal findings.  BONES: No acute osseous changes.    No definite acute cardiopulmonary disease. Signed by Garrick Rajput   Results for orders placed or performed during the hospital encounter of 04/17/24 (from the past 24 hour(s))   CBC and Auto Differential   Result Value Ref Range    WBC 7.2 4.4 - 11.3 x10*3/uL    nRBC 0.0 0.0 - 0.0 /100 WBCs    RBC 3.42 (L) 4.50 - 5.90 x10*6/uL    Hemoglobin 10.8 (L) 13.5 - 17.5 g/dL    Hematocrit 32.5 (L) 41.0 - 52.0 %    MCV 95 80 - 100 fL    MCH 31.6 26.0 - 34.0 pg    MCHC 33.2 32.0 - 36.0 g/dL    RDW 16.3 (H) 11.5 - 14.5 %    Platelets 205 150 - 450 x10*3/uL    Neutrophils % 60.8 40.0 - 80.0 %    Immature Granulocytes %, Automated 0.6 0.0 - 0.9 %    Lymphocytes % 28.5 13.0 - 44.0 %    Monocytes % 8.4 2.0 - 10.0 %    Eosinophils % 1.4 0.0 - 6.0 %    Basophils % 0.3 0.0 - 2.0 %    Neutrophils Absolute 4.41 1.60 - 5.50 x10*3/uL    Immature Granulocytes Absolute, Automated 0.04 0.00 - 0.50 x10*3/uL    Lymphocytes Absolute 2.06 0.80 - 3.00 x10*3/uL    Monocytes Absolute 0.61 0.05 - 0.80 x10*3/uL    Eosinophils Absolute 0.10 0.00 - 0.40 x10*3/uL    Basophils Absolute 0.02 0.00 - 0.10 x10*3/uL   Comprehensive Metabolic Panel   Result Value Ref Range    Glucose 155 (H) 74 - 99 mg/dL    Sodium 141 136 - 145 mmol/L    Potassium 4.3 3.5 - 5.3 mmol/L    Chloride 108 (H) 98 - 107 mmol/L    Bicarbonate 25 21 - 32 mmol/L    Anion Gap 12 10 - 20 mmol/L    Urea Nitrogen 17 6 - 23 mg/dL    Creatinine 2.03 (H) 0.50 - 1.30 mg/dL    eGFR 33 (L) >60 mL/min/1.73m*2    Calcium  8.9 8.6 - 10.3 mg/dL    Albumin 3.7 3.4 - 5.0 g/dL    Alkaline Phosphatase 35 33 - 136 U/L    Total Protein 6.3 (L) 6.4 - 8.2 g/dL    AST 12 9 - 39 U/L    Bilirubin, Total 0.5 0.0 - 1.2 mg/dL    ALT 9 (L) 10 - 52 U/L   Magnesium   Result Value Ref Range    Magnesium 1.88 1.60 - 2.40 mg/dL   Coagulation Screen   Result Value Ref Range    Protime 10.4 9.8 - 12.8 seconds    INR 0.9 0.9 - 1.1    aPTT 25 (L) 27 - 38 seconds   Troponin I, High Sensitivity, Initial   Result Value Ref Range    Troponin I, High Sensitivity 10 0 - 20 ng/L   Troponin, High Sensitivity, 1 Hour   Result Value Ref Range    Troponin I, High Sensitivity 8 0 - 20 ng/L   Lactate   Result Value Ref Range    Lactate 2.0 0.4 - 2.0 mmol/L   Urinalysis with Reflex Microscopic   Result Value Ref Range    Color, Urine Christel (N) Straw, Yellow    Appearance, Urine Hazy (N) Clear    Specific Gravity, Urine 1.023 1.005 - 1.035    pH, Urine 5.0 5.0, 5.5, 6.0, 6.5, 7.0, 7.5, 8.0    Protein, Urine 100 (2+) (N) NEGATIVE mg/dL    Glucose, Urine NEGATIVE NEGATIVE mg/dL    Blood, Urine NEGATIVE NEGATIVE    Ketones, Urine 5 (TRACE) (A) NEGATIVE mg/dL    Bilirubin, Urine NEGATIVE NEGATIVE    Urobilinogen, Urine 4.0 (N) <2.0 mg/dL    Nitrite, Urine NEGATIVE NEGATIVE    Leukocyte Esterase, Urine NEGATIVE NEGATIVE   Microscopic Only, Urine   Result Value Ref Range    WBC, Urine 1-5 1-5, NONE /HPF    RBC, Urine 6-10 (A) NONE, 1-2, 3-5 /HPF    Mucus, Urine 2+ Reference range not established. /LPF    Hyaline Casts, Urine 3+ (A) NONE /LPF       Assessment/Plan   Reza is a 74-year-old male patient who is alert and oriented to self, presenting from skilled nursing facility for evaluation of a syncopal episode.  Patient states that he syncopized while having a bowel movement, woke up, and syncopized again.  Patient denies hitting his head or loss of consciousness.  Patient denies any complaints at this time, presented to ED hypotensive, received IV fluids and has no current  complaints.  UTI negative, DYLON positive.  Imaging completed and negative for acute process.  Patient admitted for further medical management.    Syncope/hypotension/DYLON  Admit to telemetry observation per Dr. Feliz  See imaging results above  IV fluids in ED/continue IV infusion  Hold home antihypertensives  Cardiac diet  Telemetry monitoring  PT/OT    Dementia/NSTEMI/pneumonia/EtOH abuse/seizures  #Chronic conditions  Hold home antihypertensives until blood pressure is stable  Continue home medications when med rec is complete  Seizure precautions  Cardiac diet    DVT Ppx  SCDs  No chemical prophylaxis due to falls risk  Continue home Plavix/aspirin  Out of bed with assistance  PT/OT    I spent 25 minutes in the professional and overall care of this patient.  Siena Isidro, PIETRO-CNP    Addendum patient seen and evaluated today stable I have reviewed his laboratories he has IV fluids running continue with his home medications agree with all the above management thank you

## 2024-04-18 NOTE — ED PROVIDER NOTES
HPI   Chief Complaint   Patient presents with    Syncope     Syncope x2 after going to restroom.       Patient is a healthy nontoxic-appearing 74-year-old male with past medical history of dementia, NSTEMI, coronary artery disease, presents to the emergency today via EMS for syncopal event.  Patient has dementia and is poor historian and is unable to describe events leading up to emergency evaluation.  EMS states nursing home staff informed them patient was having a bowel movement at nursing home when he had syncopal event as witnessed by nursing home staff.  Nursing staff were able to wake patient however upon awaking had recurrent syncopal event.  EMS state patient did not have a fall patient syncopal event while on commode.  Patient is alert and oriented to self, denies any injuries trauma or falls, headache pain, lightness, dizziness, visual disturbances, chest pain, shortness of breath or difficulty breathing, abdominal pain with nausea, vomit, diarrhea or constipation, fever, shaking, or chills.                          [place] Coma Scale Score: 14                     Patient History   History reviewed. No pertinent past medical history.  History reviewed. No pertinent surgical history.  Family History   Family history unknown: Yes     Social History     Tobacco Use    Smoking status: Never     Passive exposure: Never    Smokeless tobacco: Never   Substance Use Topics    Alcohol use: Never    Drug use: Never       Physical Exam   ED Triage Vitals [04/17/24 2318]   Temperature Heart Rate Respirations BP   36.7 °C (98 °F) 66 18 78/53      Pulse Ox Temp Source Heart Rate Source Patient Position   96 % Temporal Monitor --      BP Location FiO2 (%)     Right arm --       Physical Exam  Vitals and nursing note reviewed. Exam conducted with a chaperone present.   Constitutional:       General: He is not in acute distress.     Appearance: Normal appearance. He is not ill-appearing, toxic-appearing or diaphoretic.       Interventions: He is not intubated.  HENT:      Head: Normocephalic.      Right Ear: Tympanic membrane, ear canal and external ear normal.      Left Ear: Tympanic membrane, ear canal and external ear normal.      Nose: Nose normal.      Mouth/Throat:      Mouth: Mucous membranes are moist.      Pharynx: No oropharyngeal exudate or posterior oropharyngeal erythema.   Eyes:      General:         Right eye: No discharge.         Left eye: No discharge.      Extraocular Movements: Extraocular movements intact.      Pupils: Pupils are equal, round, and reactive to light.   Neck:      Vascular: No carotid bruit.   Cardiovascular:      Rate and Rhythm: Normal rate and regular rhythm.      Pulses: Normal pulses. No decreased pulses.      Heart sounds: Normal heart sounds. Heart sounds not distant. No murmur heard.     No friction rub. No gallop.   Pulmonary:      Effort: Pulmonary effort is normal. No tachypnea, bradypnea, accessory muscle usage, prolonged expiration, respiratory distress or retractions. He is not intubated.      Breath sounds: Normal breath sounds. No stridor, decreased air movement or transmitted upper airway sounds. No decreased breath sounds, wheezing, rhonchi or rales.   Chest:      Chest wall: No tenderness.   Abdominal:      General: Abdomen is flat. Bowel sounds are normal. There is no distension.      Palpations: Abdomen is soft. There is no mass.      Tenderness: There is no abdominal tenderness. There is no right CVA tenderness, left CVA tenderness, guarding or rebound.      Hernia: No hernia is present.   Musculoskeletal:         General: Normal range of motion.      Cervical back: Normal range of motion and neck supple. No rigidity or tenderness.   Lymphadenopathy:      Cervical: No cervical adenopathy.   Skin:     General: Skin is warm and dry.      Capillary Refill: Capillary refill takes less than 2 seconds.   Neurological:      General: No focal deficit present.      Mental Status: He is  alert. Mental status is at baseline.      Cranial Nerves: No cranial nerve deficit.      Sensory: No sensory deficit.      Motor: Weakness present.      Coordination: Coordination abnormal.      Gait: Gait normal.      Deep Tendon Reflexes: Reflexes normal.         ED Course & MDM   ED Course as of 04/18/24 0412   Thu Apr 18, 2024   0151 Chest x-ray reveals  IMPRESSION:  No definite acute cardiopulmonary disease.   [EC]   0212 CT of the head reveals  IMPRESSION:  No acute intracranial abnormality.      Moderate generalized cerebral volume loss.   [EC]   0237 EGFR(!): 33 [EC]   0237 Creatinine(!): 2.03 [EC]      ED Course User Index  [EC] Maikel Sawant, APRN-CNP         Diagnoses as of 04/18/24 0412   Syncope, unspecified syncope type   DYOLN (acute kidney injury) (CMS-[facility])   Hypotension, unspecified hypotension type       Medical Decision Making  Given patient complaint presentation a thorough exam was performed.  Patient is alert and oriented to self which is patient's normal baseline per nursing home staff.  Patient has no complaints upon arrival to emergency room does not recall entire event.  Patient does have dementia.  On exam patient does respond to questions appropriately however does not follow all commands and does have weakness present bilateral upper and lower extremities, no adventitious lung sounds auscultated, speaking without any distress, cardiac sounds auscultated are regular, I have low suspicion for acute intracranial process, meningitis, mastoiditis, ACS, pulmonary embolism, aortic abdominal aneurysm, cardiac arrhythmia.  Lab work was ordered including EKG, chest x-ray, CT of the head.  Lab work revealed several irregularities without any critical lab values.  EKG as interpreted by myself reveals sinus rhythm at a rate of 65 bpm with no ST elevation or T wave inversion and is similar in comparison to previous EKG. Lab work revealed several irregularities including GFR of 33 and creatinine 2.03.   This is a marked change from 9 days ago when labs are unremarkable.  Patient's initial blood pressure upon arrival was 78/53.  Patient received a liter bag of fluid and reevaluation blood pressure reveals slight improvement to 85/54.  Lactate was ordered including additional liter of IV fluid.  Lactate was normal with a value of 2.0.  Reevaluation of blood pressure did reveal improvement up to 93/57.  Given patient's syncopal event, hypotension and DYLON I consulted Dr. Feliz regards to admission.  Dr. Feliz has agreed admit patient to his service and will manage inpatient care.    BETHANY Regalado     Portions of this note were generated using digital voice recognition software, and may contain grammatical errors      Procedure  Procedures     BETHANY Regalado  04/18/24 0413

## 2024-04-19 LAB
ANION GAP SERPL CALC-SCNC: 10 MMOL/L (ref 10–20)
BUN SERPL-MCNC: 12 MG/DL (ref 6–23)
CALCIUM SERPL-MCNC: 9 MG/DL (ref 8.6–10.3)
CHLORIDE SERPL-SCNC: 110 MMOL/L (ref 98–107)
CO2 SERPL-SCNC: 26 MMOL/L (ref 21–32)
CREAT SERPL-MCNC: 1.16 MG/DL (ref 0.5–1.3)
EGFRCR SERPLBLD CKD-EPI 2021: 65 ML/MIN/1.73M*2
GLUCOSE SERPL-MCNC: 121 MG/DL (ref 74–99)
POTASSIUM SERPL-SCNC: 4.2 MMOL/L (ref 3.5–5.3)
SODIUM SERPL-SCNC: 142 MMOL/L (ref 136–145)

## 2024-04-19 PROCEDURE — 2500000004 HC RX 250 GENERAL PHARMACY W/ HCPCS (ALT 636 FOR OP/ED): Performed by: NURSE PRACTITIONER

## 2024-04-19 PROCEDURE — 80048 BASIC METABOLIC PNL TOTAL CA: CPT | Performed by: INTERNAL MEDICINE

## 2024-04-19 PROCEDURE — 2060000001 HC INTERMEDIATE ICU ROOM DAILY

## 2024-04-19 PROCEDURE — 2500000001 HC RX 250 WO HCPCS SELF ADMINISTERED DRUGS (ALT 637 FOR MEDICARE OP): Performed by: NURSE PRACTITIONER

## 2024-04-19 PROCEDURE — 36415 COLL VENOUS BLD VENIPUNCTURE: CPT | Performed by: INTERNAL MEDICINE

## 2024-04-19 PROCEDURE — 2500000006 HC RX 250 W HCPCS SELF ADMINISTERED DRUGS (ALT 637 FOR ALL PAYERS): Performed by: NURSE PRACTITIONER

## 2024-04-19 RX ORDER — SODIUM CHLORIDE, SODIUM LACTATE, POTASSIUM CHLORIDE, CALCIUM CHLORIDE 600; 310; 30; 20 MG/100ML; MG/100ML; MG/100ML; MG/100ML
50 INJECTION, SOLUTION INTRAVENOUS CONTINUOUS
Status: ACTIVE | OUTPATIENT
Start: 2024-04-19 | End: 2024-04-20

## 2024-04-19 RX ADMIN — SODIUM CHLORIDE, POTASSIUM CHLORIDE, SODIUM LACTATE AND CALCIUM CHLORIDE 50 ML/HR: 600; 310; 30; 20 INJECTION, SOLUTION INTRAVENOUS at 16:40

## 2024-04-19 RX ADMIN — DIVALPROEX SODIUM 500 MG: 125 CAPSULE, COATED PELLETS ORAL at 08:19

## 2024-04-19 RX ADMIN — ESCITALOPRAM OXALATE 10 MG: 10 TABLET ORAL at 08:19

## 2024-04-19 RX ADMIN — DONEPEZIL HYDROCHLORIDE 5 MG: 5 TABLET, FILM COATED ORAL at 20:12

## 2024-04-19 RX ADMIN — ACETAMINOPHEN 650 MG: 325 TABLET ORAL at 16:40

## 2024-04-19 RX ADMIN — Medication 1 TABLET: at 08:19

## 2024-04-19 RX ADMIN — PANTOPRAZOLE SODIUM 20 MG: 20 TABLET, DELAYED RELEASE ORAL at 08:19

## 2024-04-19 RX ADMIN — ASPIRIN 81 MG: 81 TABLET, COATED ORAL at 08:19

## 2024-04-19 RX ADMIN — CLOPIDOGREL BISULFATE 75 MG: 75 TABLET ORAL at 08:19

## 2024-04-19 RX ADMIN — ISOSORBIDE MONONITRATE 30 MG: 30 TABLET, EXTENDED RELEASE ORAL at 08:19

## 2024-04-19 RX ADMIN — DIVALPROEX SODIUM 500 MG: 125 CAPSULE, COATED PELLETS ORAL at 20:12

## 2024-04-19 RX ADMIN — ATORVASTATIN CALCIUM 80 MG: 80 TABLET, FILM COATED ORAL at 20:12

## 2024-04-19 ASSESSMENT — COGNITIVE AND FUNCTIONAL STATUS - GENERAL
CLIMB 3 TO 5 STEPS WITH RAILING: TOTAL
CLIMB 3 TO 5 STEPS WITH RAILING: TOTAL
HELP NEEDED FOR BATHING: TOTAL
TURNING FROM BACK TO SIDE WHILE IN FLAT BAD: A LOT
MOBILITY SCORE: 10
WALKING IN HOSPITAL ROOM: TOTAL
DAILY ACTIVITIY SCORE: 10
MOVING FROM LYING ON BACK TO SITTING ON SIDE OF FLAT BED WITH BEDRAILS: A LOT
DRESSING REGULAR LOWER BODY CLOTHING: TOTAL
STANDING UP FROM CHAIR USING ARMS: TOTAL
TOILETING: TOTAL
DRESSING REGULAR LOWER BODY CLOTHING: TOTAL
TOILETING: TOTAL
DRESSING REGULAR UPPER BODY CLOTHING: A LOT
HELP NEEDED FOR BATHING: A LOT
DAILY ACTIVITIY SCORE: 11
MOVING FROM LYING ON BACK TO SITTING ON SIDE OF FLAT BED WITH BEDRAILS: A LOT
MOVING TO AND FROM BED TO CHAIR: A LOT
PERSONAL GROOMING: A LOT
PERSONAL GROOMING: A LOT
MOVING TO AND FROM BED TO CHAIR: A LOT
STANDING UP FROM CHAIR USING ARMS: A LOT
WALKING IN HOSPITAL ROOM: TOTAL
MOBILITY SCORE: 9
EATING MEALS: A LITTLE
TURNING FROM BACK TO SIDE WHILE IN FLAT BAD: A LOT
EATING MEALS: A LITTLE
DRESSING REGULAR UPPER BODY CLOTHING: A LOT

## 2024-04-19 ASSESSMENT — PAIN - FUNCTIONAL ASSESSMENT: PAIN_FUNCTIONAL_ASSESSMENT: 0-10

## 2024-04-19 ASSESSMENT — PAIN SCALES - GENERAL
PAINLEVEL_OUTOF10: 0 - NO PAIN
PAINLEVEL_OUTOF10: 0 - NO PAIN

## 2024-04-19 NOTE — CARE PLAN
The patient's goals for the shift include      The clinical goals for the shift include patient will be HDS through out the shift.    Problem: Pain  Goal: My pain/discomfort is manageable  Outcome: Progressing

## 2024-04-19 NOTE — CARE PLAN
The patient's goals for the shift include      The clinical goals for the shift include patient will be HDS through out the shift.    Patient was stable through out the night.

## 2024-04-19 NOTE — CONSULTS
"Nutrition Initial Assessment:   Nutrition Assessment    Reason for Assessment: Admission nursing screening, Dietitian discretion (missed MST=2 for unsure of weight loss)    Patient is a 74 y.o. male presenting with syncope    PmHx: dementia, NSTEMI, CAD, COVID 19, ETOH abuse, seizures, pneumonia     Nutrition History:  Food and Nutrient History: Pt is confused.  Pt ate 75% x1 meal so far  Food Allergies/Intolerances:  None  GI Symptoms: None  Oral Problems:  JULIANA       Anthropometrics:  Height: 177.8 cm (5' 10\")   Weight: 87.5 kg (192 lb 14.4 oz)   BMI (Calculated): 27.68  IBW/kg (Dietitian Calculated): 75.4 kg  Percent of IBW: 116 %       Weight History:     Weight Change %:  Weight History / % Weight Change: 10/23/23 74.4kg (gain 17% x6 mo), 10/9/23 75.6kg, 4/20/23 75.8kg  Significant Weight Loss: No    Nutrition Focused Physical Exam Findings:  defer: confusion   Subcutaneous Fat Loss:   Orbital Fat Pads: Defer  Muscle Wasting:     Edema:  Edema: +2 mild  Edema Location: BLE  Physical Findings:  Skin: Negative    Nutrition Significant Labs:  CBC Trend:   Results from last 7 days   Lab Units 04/18/24  0023   WBC AUTO x10*3/uL 7.2   RBC AUTO x10*6/uL 3.42*   HEMOGLOBIN g/dL 10.8*   HEMATOCRIT % 32.5*   MCV fL 95   PLATELETS AUTO x10*3/uL 205    , BMP Trend:   Results from last 7 days   Lab Units 04/18/24  0023   GLUCOSE mg/dL 155*   CALCIUM mg/dL 8.9   SODIUM mmol/L 141   POTASSIUM mmol/L 4.3   CO2 mmol/L 25   CHLORIDE mmol/L 108*   BUN mg/dL 17   CREATININE mg/dL 2.03*        Nutrition Specific Medications:  Reviewed     I/O:   Last BM Date: 04/18/24;      Dietary Orders (From admission, onward)       Start     Ordered    04/18/24 0537  Adult diet Cardiac; 70 gm fat; 2 - 3 grams Sodium  Diet effective now        Question Answer Comment   Diet type Cardiac    Fat restriction: 70 gm fat    Sodium restriction: 2 - 3 grams Sodium        04/18/24 0538                     Estimated Needs:      Method for Estimating " Needs: 1885-2025kcals (25-27kcals/kg IBW)     Method for Estimating Needs: 60-75g (0.8-1.0g/kg IBW)     Method for Estimating Needs: 1 mL/kcal or as per MD        Nutrition Diagnosis   Malnutrition Diagnosis  Patient has Malnutrition Diagnosis: No    Nutrition Diagnosis  Patient has Nutrition Diagnosis: No       Nutrition Interventions/Recommendations         Nutrition Prescription:  Individualized Nutrition Prescription Provided for : Cardiac diet as ordered.  Will order ONS if meal intakes average <75%        Nutrition Interventions:   Interventions: Meals and snacks  Meals and Snacks: Mineral-modified diet, Fat-modified diet  Goal: consume >75% of meals; nursing staff to document meal % consumed on flow sheet         Nutrition Education:   Not appropriate due to dementia        Nutrition Monitoring and Evaluation   Food/Nutrient Related History Monitoring  Monitoring and Evaluation Plan: Energy intake, Fluid intake, Amount of food  Energy Intake: Estimated energy intake  Criteria: Meal/ONS intake to meet >75% of estimated needs  Fluid Intake: Estimated fluid intake  Criteria: fluid intake to meet >75% of estimated need  Amount of Food: Estimated amout of food  Criteria: Pt to consume >75% of meals/ONS    Body Composition/Growth/Weight History  Monitoring and Evaluation Plan: Weight  Weight: Measured weight  Criteria: reweigh at least every 5 days    Biochemical Data, Medical Tests and Procedures  Monitoring and Evaluation Plan: Electrolyte/renal panel  Criteria: labs WNL    Nutrition Focused Physical Findings  Monitoring and Evaluation Plan: Digestive System, Skin  Criteria: BM at least every 2-3 days  Criteria: maintain skin integrity       Time Spent/Follow-up Reminder:   Time Spent (min): 45 minutes  Last Date of Nutrition Visit: 04/19/24  Nutrition Follow-Up Needed?: 3-5 days  Follow up Comment: 4/24 TG

## 2024-04-19 NOTE — PROGRESS NOTES
Reza Lockett is a 74 y.o. male on day 1 of admission presenting with Syncope, unspecified syncope type.      Subjective   74 y.o. male presenting to emergency department for evaluation of a syncopal episode.  Patient presents from skilled nursing facility, brought in by EMS after having a syncopal episode on the toilet.         Objective     Last Recorded Vitals  /76 (BP Location: Left arm, Patient Position: Lying)   Pulse 70   Temp 36 °C (96.8 °F) (Temporal)   Resp 16   Wt 87.5 kg (192 lb 14.4 oz)   SpO2 100%   Intake/Output last 3 Shifts:    Intake/Output Summary (Last 24 hours) at 4/19/2024 1324  Last data filed at 4/19/2024 0819  Gross per 24 hour   Intake 973.33 ml   Output 3275 ml   Net -2301.67 ml       Admission Weight  Weight: 87.5 kg (192 lb 14.4 oz) (04/18/24 0529)    Daily Weight  04/19/24 : 87.5 kg (192 lb 14.4 oz)    Image Results  CT head wo IV contrast  Narrative: Interpreted By:  Shubham Beltran,   STUDY:  CT HEAD WO IV CONTRAST;  4/18/2024 1:47 am      INDICATION:  Signs/Symptoms:syncope.      COMPARISON:  10/09/2023.      ACCESSION NUMBER(S):  [id number]      ORDERING CLINICIAN:  ZURDO MCRAE      TECHNIQUE:  Axial noncontrast CT images of the head.      FINDINGS:  Gray-white matter differentiation is maintained. There are no  extra-axial collections. No mass effect or midline shift. There is no  hydrocephalus. There is generalized cerebral volume loss and  associated ventricular and sulcal enlargement. Scattered  periventricular hypodensities suggestive of mild chronic  microvascular ischemic change..      HEMORRHAGE: No acute intracranial hemorrhage.      CALVARIUM: No depressed skull fracture.      EXTRACRANIAL SOFT TISSUES:. There is midline scalp vertex soft tissue  thickening unchanged from prior.      PARANASAL SINUSES/MASTOIDS: The visualized paranasal sinuses are well  aerated. Mastoid air cells are clear.      ORBITS: Grossly normal.      Impression: No acute intracranial  abnormality.      Moderate generalized cerebral volume loss.      Signed by: Shubham Beltran 4/18/2024 2:10 AM  Dictation workstation:   [id number]  XR chest 2 views  Narrative: STUDY:  Chest Radiographs;  4/18/2024 at 12:07 AM  INDICATION:  Syncope.  COMPARISON:  XR chest 10/9/2023, CT chest 3/17/2023, XR chest 3/17/2023.  ACCESSION NUMBER(S):  [id number]  ORDERING CLINICIAN:  ZURDO MCRAE  TECHNIQUE:  Frontal and lateral chest.   FINDINGS:  CARDIOMEDIASTINAL SILHOUETTE:  Cardiomediastinal silhouette is normal in size and configuration.     LUNGS:  Lungs are clear.  There is slightly diminished inspiration.     ABDOMEN:  No remarkable upper abdominal findings.     BONES:  No acute osseous changes.  Impression: No definite acute cardiopulmonary disease.  Signed by Garrick Rajput    Results from last 7 days   Lab Units 04/18/24  0023   WBC AUTO x10*3/uL 7.2   HEMOGLOBIN g/dL 10.8*   HEMATOCRIT % 32.5*   PLATELETS AUTO x10*3/uL 205      Results from last 7 days   Lab Units 04/18/24  0023   SODIUM mmol/L 141   POTASSIUM mmol/L 4.3   CHLORIDE mmol/L 108*   CO2 mmol/L 25   BUN mg/dL 17   CREATININE mg/dL 2.03*   GLUCOSE mg/dL 155*   CALCIUM mg/dL 8.9      In the review of systems is weakness no further syncopal events no vomiting diarrhea and no fever    Physical Exam  Lungs are diminished heart has regular rate and rhythm abdomen is soft is not distended or firm  Relevant Results               Assessment/Plan   This patient currently has cardiac telemetry ordered; if you would like to modify or discontinue the telemetry order, click here to go to the orders activity to modify/discontinue the order.      This patient has a urinary catheter   Reason for the urinary catheter remaining today?           Principal Problem:    Syncope, unspecified syncope type    Looks like dehydration I am continuing with my IV fluids at this time at a rate of 75 an hour also his renal function would like to see if that improves his last  creatinine of 2.03    Will repeat the basic metabolic panel today continue with the IV fluids for another 24 hours see if his renal function improves through that I have also reviewed his his electrolytes as well as his H&H maintain his oxygen saturations continue with DVT prophylaxis and I will review it and continue with his other managements as well thank you              Gómez Miguel, DO

## 2024-04-20 PROCEDURE — 2500000001 HC RX 250 WO HCPCS SELF ADMINISTERED DRUGS (ALT 637 FOR MEDICARE OP): Performed by: NURSE PRACTITIONER

## 2024-04-20 PROCEDURE — 2060000001 HC INTERMEDIATE ICU ROOM DAILY

## 2024-04-20 PROCEDURE — 2500000001 HC RX 250 WO HCPCS SELF ADMINISTERED DRUGS (ALT 637 FOR MEDICARE OP)

## 2024-04-20 PROCEDURE — 2500000006 HC RX 250 W HCPCS SELF ADMINISTERED DRUGS (ALT 637 FOR ALL PAYERS): Performed by: NURSE PRACTITIONER

## 2024-04-20 RX ORDER — ACETAMINOPHEN 500 MG
5 TABLET ORAL ONCE
Status: COMPLETED | OUTPATIENT
Start: 2024-04-20 | End: 2024-04-20

## 2024-04-20 RX ADMIN — DIVALPROEX SODIUM 500 MG: 125 CAPSULE, COATED PELLETS ORAL at 20:22

## 2024-04-20 RX ADMIN — CLOPIDOGREL BISULFATE 75 MG: 75 TABLET ORAL at 09:53

## 2024-04-20 RX ADMIN — ACETAMINOPHEN 650 MG: 325 TABLET ORAL at 22:25

## 2024-04-20 RX ADMIN — ESCITALOPRAM OXALATE 10 MG: 10 TABLET ORAL at 09:53

## 2024-04-20 RX ADMIN — Medication 1 TABLET: at 09:53

## 2024-04-20 RX ADMIN — Medication 5 MG: at 22:25

## 2024-04-20 RX ADMIN — ASPIRIN 81 MG: 81 TABLET, COATED ORAL at 09:53

## 2024-04-20 RX ADMIN — ATORVASTATIN CALCIUM 80 MG: 80 TABLET, FILM COATED ORAL at 20:22

## 2024-04-20 RX ADMIN — PANTOPRAZOLE SODIUM 20 MG: 20 TABLET, DELAYED RELEASE ORAL at 09:53

## 2024-04-20 RX ADMIN — DONEPEZIL HYDROCHLORIDE 5 MG: 5 TABLET, FILM COATED ORAL at 20:22

## 2024-04-20 RX ADMIN — ISOSORBIDE MONONITRATE 30 MG: 30 TABLET, EXTENDED RELEASE ORAL at 09:53

## 2024-04-20 RX ADMIN — DIVALPROEX SODIUM 500 MG: 125 CAPSULE, COATED PELLETS ORAL at 09:53

## 2024-04-20 ASSESSMENT — PAIN SCALES - GENERAL
PAINLEVEL_OUTOF10: 0 - NO PAIN
PAINLEVEL_OUTOF10: 0 - NO PAIN

## 2024-04-20 NOTE — PROGRESS NOTES
Reza Lockett is a 74 y.o. male on day 2 of admission presenting with Syncope, unspecified syncope type.      Subjective   Seen today waiting for transfer back to [facility] so far he has no fever his saturations are good hemodynamically stable       Objective     Last Recorded Vitals  /76   Pulse 68   Temp 36 °C (96.8 °F)   Resp 16   Wt 87.5 kg (192 lb 14.4 oz)   SpO2 97%   Intake/Output last 3 Shifts:    Intake/Output Summary (Last 24 hours) at 4/20/2024 1600  Last data filed at 4/19/2024 2222  Gross per 24 hour   Intake 285 ml   Output 400 ml   Net -115 ml       Admission Weight  Weight: 87.5 kg (192 lb 14.4 oz) (04/18/24 0529)    Daily Weight  04/19/24 : 87.5 kg (192 lb 14.4 oz)    Image Results  CT head wo IV contrast  Narrative: Interpreted By:  Shubham Beltran,   STUDY:  CT HEAD WO IV CONTRAST;  4/18/2024 1:47 am      INDICATION:  Signs/Symptoms:syncope.      COMPARISON:  10/09/2023.      ACCESSION NUMBER(S):  [id number]      ORDERING CLINICIAN:  ZURDO MCRAE      TECHNIQUE:  Axial noncontrast CT images of the head.      FINDINGS:  Gray-white matter differentiation is maintained. There are no  extra-axial collections. No mass effect or midline shift. There is no  hydrocephalus. There is generalized cerebral volume loss and  associated ventricular and sulcal enlargement. Scattered  periventricular hypodensities suggestive of mild chronic  microvascular ischemic change..      HEMORRHAGE: No acute intracranial hemorrhage.      CALVARIUM: No depressed skull fracture.      EXTRACRANIAL SOFT TISSUES:. There is midline scalp vertex soft tissue  thickening unchanged from prior.      PARANASAL SINUSES/MASTOIDS: The visualized paranasal sinuses are well  aerated. Mastoid air cells are clear.      ORBITS: Grossly normal.      Impression: No acute intracranial abnormality.      Moderate generalized cerebral volume loss.      Signed by: Shubham Beltran 4/18/2024 2:10 AM  Dictation workstation:   [id number]  XR chest  2 views  Narrative: STUDY:  Chest Radiographs;  4/18/2024 at 12:07 AM  INDICATION:  Syncope.  COMPARISON:  XR chest 10/9/2023, CT chest 3/17/2023, XR chest 3/17/2023.  ACCESSION NUMBER(S):  [id number]  ORDERING CLINICIAN:  ZURDO MCRAE  TECHNIQUE:  Frontal and lateral chest.   FINDINGS:  CARDIOMEDIASTINAL SILHOUETTE:  Cardiomediastinal silhouette is normal in size and configuration.     LUNGS:  Lungs are clear.  There is slightly diminished inspiration.     ABDOMEN:  No remarkable upper abdominal findings.     BONES:  No acute osseous changes.  Impression: No definite acute cardiopulmonary disease.  Signed by Garrick Rajput    Results from last 7 days   Lab Units 04/18/24  0023   WBC AUTO x10*3/uL 7.2   HEMOGLOBIN g/dL 10.8*   HEMATOCRIT % 32.5*   PLATELETS AUTO x10*3/uL 205      Results from last 7 days   Lab Units 04/19/24  1351   SODIUM mmol/L 142   POTASSIUM mmol/L 4.2   CHLORIDE mmol/L 110*   CO2 mmol/L 26   BUN mg/dL 12   CREATININE mg/dL 1.16   GLUCOSE mg/dL 121*   CALCIUM mg/dL 9.0      In the review of systems no chest pain shortness of breath no fever tachycardia or tachypnea    Physical Exam  Lungs are clear anteriorly heart has a regular rate and rhythm abdomen is soft is not distended or firm  Relevant Results               Assessment/Plan   This patient currently has cardiac telemetry ordered; if you would like to modify or discontinue the telemetry order, click here to go to the orders activity to modify/discontinue the order.              Principal Problem:    Syncope, unspecified syncope type    Syncope difficulty really determine its etiology for now gave him fluids stabilized him he can go back to the ECF at any time discharge planning has been completed continue all present care and therapy thank you              Gómez Miguel DO

## 2024-04-20 NOTE — CARE PLAN
Problem: Pain  Goal: My pain/discomfort is manageable  4/20/2024 1034 by Aaron Coello RN  Outcome: Progressing  4/20/2024 1034 by Aaron Coello RN  Outcome: Progressing     Problem: Safety  Goal: Patient will be injury free during hospitalization  4/20/2024 1034 by Aaron Coello RN  Outcome: Progressing  4/20/2024 1034 by Aaron Coello RN  Outcome: Progressing  Goal: I will remain free of falls  4/20/2024 1034 by Aaron Coello RN  Outcome: Progressing  4/20/2024 1034 by Aaron Coello RN  Outcome: Progressing     Problem: Psychosocial Needs  Goal: Demonstrates ability to cope with hospitalization/illness  4/20/2024 1034 by Aaron Coello RN  Outcome: Progressing  4/20/2024 1034 by Aaron Coello RN  Outcome: Progressing  Goal: Collaborate with me, my family, and caregiver to identify my specific goals  4/20/2024 1034 by Aaron Coello RN  Outcome: Progressing  4/20/2024 1034 by Aaron Coello RN  Outcome: Progressing     Problem: Skin  Goal: Participates in plan/prevention/treatment measures  4/20/2024 1034 by Aaron Coello RN  Outcome: Progressing  Flowsheets (Taken 4/20/2024 1034)  Participates in plan/prevention/treatment measures: Elevate heels  4/20/2024 1034 by Aaron Coello RN  Outcome: Progressing  Flowsheets (Taken 4/20/2024 1034)  Participates in plan/prevention/treatment measures: Elevate heels  Goal: Prevent/manage excess moisture  4/20/2024 1034 by Aaron Coello RN  Outcome: Progressing  Flowsheets (Taken 4/20/2024 1034)  Prevent/manage excess moisture:   Cleanse incontinence/protect with barrier cream   Moisturize dry skin  4/20/2024 1034 by Aaron Coello RN  Outcome: Progressing  Flowsheets (Taken 4/20/2024 1034)  Prevent/manage excess moisture:   Cleanse incontinence/protect with barrier cream   Moisturize dry skin  Goal: Prevent/minimize sheer/friction injuries  4/20/2024 1034 by Aaron Coello,  RN  Outcome: Progressing  Flowsheets (Taken 4/20/2024 1034)  Prevent/minimize sheer/friction injuries:   Turn/reposition every 2 hours/use positioning/transfer devices   HOB 30 degrees or less  4/20/2024 1034 by Aaron Coello RN  Outcome: Progressing  Flowsheets (Taken 4/20/2024 1034)  Prevent/minimize sheer/friction injuries:   Turn/reposition every 2 hours/use positioning/transfer devices   HOB 30 degrees or less  Goal: Promote/optimize nutrition  4/20/2024 1034 by Aaron Coello RN  Outcome: Progressing  Flowsheets (Taken 4/20/2024 1034)  Promote/optimize nutrition: Consume > 50% meals/supplements  4/20/2024 1034 by Aaron Coello RN  Outcome: Progressing  Flowsheets (Taken 4/20/2024 1034)  Promote/optimize nutrition: Consume > 50% meals/supplements

## 2024-04-20 NOTE — NURSING NOTE
Saw patient holding his tong. He pulled it out. Noticed the balloon wasn't inflated. Patient had no recollection on pulling the tong out. Informed House NP regarding sudden removal of tong. Monitor for now and bladder scan patient after 6 hours if the patient is retaining.

## 2024-04-21 VITALS
DIASTOLIC BLOOD PRESSURE: 80 MMHG | SYSTOLIC BLOOD PRESSURE: 137 MMHG | TEMPERATURE: 95.9 F | RESPIRATION RATE: 18 BRPM | HEIGHT: 70 IN | BODY MASS INDEX: 27.62 KG/M2 | HEART RATE: 70 BPM | WEIGHT: 192.9 LBS | OXYGEN SATURATION: 96 %

## 2024-04-21 PROCEDURE — 2500000001 HC RX 250 WO HCPCS SELF ADMINISTERED DRUGS (ALT 637 FOR MEDICARE OP): Performed by: NURSE PRACTITIONER

## 2024-04-21 PROCEDURE — 2500000006 HC RX 250 W HCPCS SELF ADMINISTERED DRUGS (ALT 637 FOR ALL PAYERS): Performed by: NURSE PRACTITIONER

## 2024-04-21 RX ADMIN — DIVALPROEX SODIUM 500 MG: 125 CAPSULE, COATED PELLETS ORAL at 10:30

## 2024-04-21 RX ADMIN — ASPIRIN 81 MG: 81 TABLET, COATED ORAL at 10:28

## 2024-04-21 RX ADMIN — Medication 1 TABLET: at 10:28

## 2024-04-21 RX ADMIN — CLOPIDOGREL BISULFATE 75 MG: 75 TABLET ORAL at 10:28

## 2024-04-21 RX ADMIN — ESCITALOPRAM OXALATE 10 MG: 10 TABLET ORAL at 10:28

## 2024-04-21 RX ADMIN — PANTOPRAZOLE SODIUM 20 MG: 20 TABLET, DELAYED RELEASE ORAL at 10:28

## 2024-04-21 RX ADMIN — ISOSORBIDE MONONITRATE 30 MG: 30 TABLET, EXTENDED RELEASE ORAL at 10:28

## 2024-04-21 ASSESSMENT — PAIN SCALES - GENERAL: PAINLEVEL_OUTOF10: 0 - NO PAIN

## 2024-04-21 ASSESSMENT — ACTIVITIES OF DAILY LIVING (ADL): LACK_OF_TRANSPORTATION: PATIENT DECLINED

## 2024-04-21 NOTE — PROGRESS NOTES
Discharge order written. LOS on gold form needs to be changed & nsg need to return my call re: transportation requirement. Kay Streeter, HUANGW     1218, Paperwork has been updated.   Spoke  with Steohaniyani pts guardian to update her,  [phone]  Spoke with nsg. Pt will require ambulance transport, has no O2 needs.   [facility] apprised, requested they follow up to arrange transport. Kay Streeter, HUANGW     1253, [facility] confirms transport @ 1330 today. Spoke with danilo Bird & updated her. She notes agreement & understanding. Kay Streeter, HUANGW

## 2024-04-21 NOTE — CARE PLAN
Problem: Pain  Goal: My pain/discomfort is manageable  Outcome: Progressing     Problem: Safety  Goal: Patient will be injury free during hospitalization  Outcome: Progressing  Goal: I will remain free of falls  Outcome: Progressing     Problem: Psychosocial Needs  Goal: Demonstrates ability to cope with hospitalization/illness  Outcome: Progressing  Goal: Collaborate with me, my family, and caregiver to identify my specific goals  Outcome: Progressing     Problem: Skin  Goal: Participates in plan/prevention/treatment measures  Outcome: Progressing  Flowsheets (Taken 4/21/2024 1205)  Participates in plan/prevention/treatment measures: Elevate heels  Goal: Prevent/manage excess moisture  Outcome: Progressing  Flowsheets (Taken 4/21/2024 1205)  Prevent/manage excess moisture: Cleanse incontinence/protect with barrier cream  Goal: Prevent/minimize sheer/friction injuries  Outcome: Progressing  Flowsheets (Taken 4/21/2024 1205)  Prevent/minimize sheer/friction injuries: Turn/reposition every 2 hours/use positioning/transfer devices  Goal: Promote/optimize nutrition  Outcome: Progressing  Flowsheets (Taken 4/21/2024 1205)  Promote/optimize nutrition:   Assist with feeding   Consume > 50% meals/supplements

## 2024-04-22 NOTE — DISCHARGE SUMMARY
Discharge Diagnosis  Syncope, unspecified syncope type    Issues Requiring Follow-Up  Patient cleared for discharge to [facility] today    Discharge Meds     Your medication list        CONTINUE taking these medications        Instructions Last Dose Given Next Dose Due   aspirin 81 mg capsule           atorvastatin 80 mg tablet  Commonly known as: Lipitor           clopidogrel 75 mg tablet  Commonly known as: Plavix           divalproex sprinkle 125 mg DR capsule  Commonly known as: Depakote Sprinkle           donepezil 5 mg tablet  Commonly known as: Aricept           escitalopram 10 mg tablet  Commonly known as: Lexapro           isosorbide mononitrate ER 30 mg 24 hr tablet  Commonly known as: Imdur      Take 1 tablet (30 mg) by mouth once daily. Do not crush or chew. Do not start before October 11, 2023.       LACTOBACILLUS ACIDOPHILUS ORAL           metoprolol tartrate 25 mg tablet  Commonly known as: Lopressor           pantoprazole 20 mg EC tablet  Commonly known as: ProtoNix                    Test Results Pending At Discharge  Pending Labs       No current pending labs.            Hospital Course   Patient fully evaluated on May 22 and cleared for discharge    Pertinent Physical Exam At Time of Discharge  Physical Exam    Outpatient Follow-Up  Future Appointments   Date Time Provider Department Center   4/29/2024 11:20 AM Silas Trejo MD [id number] [place]         Gómez Miguel DO   Physician  Internal Medicine     Progress Notes      Signed     Date of Service: 4/20/2024  4:00 PM     Signed       Expand All Collapse All    Reza Lockett is a 74 y.o. male on day 2 of admission presenting with Syncope, unspecified syncope type.           Subjective   Seen today waiting for transfer back to [facility] so far he has no fever his saturations are good hemodynamically stable              Objective   Last Recorded Vitals  /76   Pulse 68   Temp 36 °C (96.8 °F)   Resp 16   Wt 87.5 kg (192 lb 14.4 oz)   SpO2 97%    Intake/Output last 3 Shifts:     Intake/Output Summary (Last 24 hours) at 4/20/2024 1600  Last data filed at 4/19/2024 2222      Gross per 24 hour   Intake 285 ml   Output 400 ml   Net -115 ml         Admission Weight  Weight: 87.5 kg (192 lb 14.4 oz) (04/18/24 0529)     Daily Weight  04/19/24 : 87.5 kg (192 lb 14.4 oz)     Image Results  CT head wo IV contrast  Narrative: Interpreted By:  Shubham Beltran,   STUDY:  CT HEAD WO IV CONTRAST;  4/18/2024 1:47 am      INDICATION:  Signs/Symptoms:syncope.      COMPARISON:  10/09/2023.      ACCESSION NUMBER(S):  [id number]      ORDERING CLINICIAN:  ZURDO MCRAE      TECHNIQUE:  Axial noncontrast CT images of the head.      FINDINGS:  Gray-white matter differentiation is maintained. There are no  extra-axial collections. No mass effect or midline shift. There is no  hydrocephalus. There is generalized cerebral volume loss and  associated ventricular and sulcal enlargement. Scattered  periventricular hypodensities suggestive of mild chronic  microvascular ischemic change..      HEMORRHAGE: No acute intracranial hemorrhage.      CALVARIUM: No depressed skull fracture.      EXTRACRANIAL SOFT TISSUES:. There is midline scalp vertex soft tissue  thickening unchanged from prior.      PARANASAL SINUSES/MASTOIDS: The visualized paranasal sinuses are well  aerated. Mastoid air cells are clear.      ORBITS: Grossly normal.      Impression: No acute intracranial abnormality.      Moderate generalized cerebral volume loss.      Signed by: Shubham Beltran 4/18/2024 2:10 AM  Dictation workstation:   [id number]  XR chest 2 views  Narrative: STUDY:  Chest Radiographs;  4/18/2024 at 12:07 AM  INDICATION:  Syncope.  COMPARISON:  XR chest 10/9/2023, CT chest 3/17/2023, XR chest 3/17/2023.  ACCESSION NUMBER(S):  [id number]  ORDERING CLINICIAN:  ZURDO MCRAE  TECHNIQUE:  Frontal and lateral chest.   FINDINGS:  CARDIOMEDIASTINAL SILHOUETTE:  Cardiomediastinal silhouette is normal in size  and configuration.     LUNGS:  Lungs are clear.  There is slightly diminished inspiration.     ABDOMEN:  No remarkable upper abdominal findings.     BONES:  No acute osseous changes.  Impression: No definite acute cardiopulmonary disease.  Signed by Garrick Rajput          Results from last 7 days   Lab Units 04/18/24  0023   WBC AUTO x10*3/uL 7.2   HEMOGLOBIN g/dL 10.8*   HEMATOCRIT % 32.5*   PLATELETS AUTO x10*3/uL 205           Results from last 7 days   Lab Units 04/19/24  1351   SODIUM mmol/L 142   POTASSIUM mmol/L 4.2   CHLORIDE mmol/L 110*   CO2 mmol/L 26   BUN mg/dL 12   CREATININE mg/dL 1.16   GLUCOSE mg/dL 121*   CALCIUM mg/dL 9.0      In the review of systems no chest pain shortness of breath no fever tachycardia or tachypnea     Physical Exam  Lungs are clear anteriorly heart has a regular rate and rhythm abdomen is soft is not distended or firm  Relevant Results    H&P      Addendum     Date of Service: 4/18/2024  6:48 AM     Addendum       Expand All Collapse All    History Of Present Illness  Reza Lockett is a 74 y.o. male presenting to emergency department for evaluation of a syncopal episode.  Patient presents from skilled nursing facility, brought in by EMS after having a syncopal episode on the toilet.  Patient states he was having a bowel movement when he became dizzy, syncopized, woke up, and syncopized again.  Patient sent in for evaluation.  Patient is alert and oriented to self, denies trauma or fall, headache, lightheadedness, dizziness, chest pain, shortness of breath, recent illness, nausea, vomiting, diarrhea.     In ED, hemoglobin 10.8, hematocrit 32.5, glucose 155, chloride 108, creatinine 2.03, GFR 33, ALT 9.  Troponin negative.  Blood pressure 109/59, heart rate 75, respirations 21, temperature 36.7 °C, SpO2 96% on room air.  CT of the head completed and negative for acute process per radiology review.  Chest x-ray completed and results pending.  Patient given IV fluids, negative for  "UTI.  Admitted to telemetry observation under the care of Dr. Feliz who will continue to follow.  I was asked to do H&P and place initial admission orders.     Past Medical History  Dementia, NSTEMI, EtOH abuse, seizures, pneumonia, chest tube s/p pneumothorax 2/2 mechanical ventilation after being diagnosed with COVID     Surgical History  Chest tube, hernia repair     Social History  Never smoker, no drug use, no alcohol use     Family History  Reviewed and noncontributory     Allergies  Patient has no known allergies.     Review of Systems   Unable to perform ROS: Dementia       Physical Exam  HENT:      Mouth/Throat:      Mouth: Mucous membranes are dry.      Pharynx: Oropharynx is clear.   Eyes:      Pupils: Pupils are equal, round, and reactive to light.   Cardiovascular:      Rate and Rhythm: Normal rate and regular rhythm.   Pulmonary:      Effort: Pulmonary effort is normal.      Breath sounds: Normal breath sounds.   Abdominal:      General: Bowel sounds are normal.      Palpations: Abdomen is soft.   Musculoskeletal:         General: Normal range of motion.      Cervical back: Normal range of motion.   Skin:     General: Skin is warm and dry.      Capillary Refill: Capillary refill takes less than 2 seconds.   Neurological:      General: No focal deficit present.      Mental Status: He is alert. Mental status is at baseline. He is disoriented.   Psychiatric:         Mood and Affect: Mood normal.         Behavior: Behavior normal.            Last Recorded Vitals  Blood pressure 119/66, pulse 78, temperature 36.1 °C (97 °F), temperature source Temporal, resp. rate 17, height 1.778 m (5' 10\"), weight 87.5 kg (192 lb 14.4 oz), SpO2 94%.     Relevant Results  CT head wo IV contrast     Result Date: 4/18/2024  Interpreted By:  Shubham Beltran, STUDY: CT HEAD WO IV CONTRAST;  4/18/2024 1:47 am   INDICATION: Signs/Symptoms:syncope.   COMPARISON: 10/09/2023.   ACCESSION NUMBER(S): [id number]   ORDERING " CLINICIAN: ZURDO MCRAE   TECHNIQUE: Axial noncontrast CT images of the head.   FINDINGS: Gray-white matter differentiation is maintained. There are no extra-axial collections. No mass effect or midline shift. There is no hydrocephalus. There is generalized cerebral volume loss and associated ventricular and sulcal enlargement. Scattered periventricular hypodensities suggestive of mild chronic microvascular ischemic change..   HEMORRHAGE: No acute intracranial hemorrhage.   CALVARIUM: No depressed skull fracture.   EXTRACRANIAL SOFT TISSUES:. There is midline scalp vertex soft tissue thickening unchanged from prior.   PARANASAL SINUSES/MASTOIDS: The visualized paranasal sinuses are well aerated. Mastoid air cells are clear.   ORBITS: Grossly normal.        No acute intracranial abnormality.   Moderate generalized cerebral volume loss.   Signed by: Shbuham Beltran 4/18/2024 2:10 AM Dictation workstation:   [id number]     XR chest 2 views     Result Date: 4/18/2024  STUDY: Chest Radiographs;  4/18/2024 at 12:07 AM INDICATION: Syncope. COMPARISON: XR chest 10/9/2023, CT chest 3/17/2023, XR chest 3/17/2023. ACCESSION NUMBER(S): [id number] ORDERING CLINICIAN: ZURDO MCRAE TECHNIQUE:  Frontal and lateral chest. FINDINGS: CARDIOMEDIASTINAL SILHOUETTE: Cardiomediastinal silhouette is normal in size and configuration.  LUNGS: Lungs are clear.  There is slightly diminished inspiration.  ABDOMEN: No remarkable upper abdominal findings.  BONES: No acute osseous changes.     No definite acute cardiopulmonary disease. Signed by Garrick Rajput         Results for orders placed or performed during the hospital encounter of 04/17/24 (from the past 24 hour(s))   CBC and Auto Differential   Result Value Ref Range     WBC 7.2 4.4 - 11.3 x10*3/uL     nRBC 0.0 0.0 - 0.0 /100 WBCs     RBC 3.42 (L) 4.50 - 5.90 x10*6/uL     Hemoglobin 10.8 (L) 13.5 - 17.5 g/dL     Hematocrit 32.5 (L) 41.0 - 52.0 %     MCV 95 80 - 100 fL     MCH 31.6 26.0 -  34.0 pg     MCHC 33.2 32.0 - 36.0 g/dL     RDW 16.3 (H) 11.5 - 14.5 %     Platelets 205 150 - 450 x10*3/uL     Neutrophils % 60.8 40.0 - 80.0 %     Immature Granulocytes %, Automated 0.6 0.0 - 0.9 %     Lymphocytes % 28.5 13.0 - 44.0 %     Monocytes % 8.4 2.0 - 10.0 %     Eosinophils % 1.4 0.0 - 6.0 %     Basophils % 0.3 0.0 - 2.0 %     Neutrophils Absolute 4.41 1.60 - 5.50 x10*3/uL     Immature Granulocytes Absolute, Automated 0.04 0.00 - 0.50 x10*3/uL     Lymphocytes Absolute 2.06 0.80 - 3.00 x10*3/uL     Monocytes Absolute 0.61 0.05 - 0.80 x10*3/uL     Eosinophils Absolute 0.10 0.00 - 0.40 x10*3/uL     Basophils Absolute 0.02 0.00 - 0.10 x10*3/uL   Comprehensive Metabolic Panel   Result Value Ref Range     Glucose 155 (H) 74 - 99 mg/dL     Sodium 141 136 - 145 mmol/L     Potassium 4.3 3.5 - 5.3 mmol/L     Chloride 108 (H) 98 - 107 mmol/L     Bicarbonate 25 21 - 32 mmol/L     Anion Gap 12 10 - 20 mmol/L     Urea Nitrogen 17 6 - 23 mg/dL     Creatinine 2.03 (H) 0.50 - 1.30 mg/dL     eGFR 33 (L) >60 mL/min/1.73m*2     Calcium 8.9 8.6 - 10.3 mg/dL     Albumin 3.7 3.4 - 5.0 g/dL     Alkaline Phosphatase 35 33 - 136 U/L     Total Protein 6.3 (L) 6.4 - 8.2 g/dL     AST 12 9 - 39 U/L     Bilirubin, Total 0.5 0.0 - 1.2 mg/dL     ALT 9 (L) 10 - 52 U/L   Magnesium   Result Value Ref Range     Magnesium 1.88 1.60 - 2.40 mg/dL   Coagulation Screen   Result Value Ref Range     Protime 10.4 9.8 - 12.8 seconds     INR 0.9 0.9 - 1.1     aPTT 25 (L) 27 - 38 seconds   Troponin I, High Sensitivity, Initial   Result Value Ref Range     Troponin I, High Sensitivity 10 0 - 20 ng/L   Troponin, High Sensitivity, 1 Hour   Result Value Ref Range     Troponin I, High Sensitivity 8 0 - 20 ng/L   Lactate   Result Value Ref Range     Lactate 2.0 0.4 - 2.0 mmol/L   Urinalysis with Reflex Microscopic   Result Value Ref Range     Color, Urine Christel (N) Straw, Yellow     Appearance, Urine Hazy (N) Clear     Specific Gravity, Urine 1.023 1.005 -  1.035     pH, Urine 5.0 5.0, 5.5, 6.0, 6.5, 7.0, 7.5, 8.0     Protein, Urine 100 (2+) (N) NEGATIVE mg/dL     Glucose, Urine NEGATIVE NEGATIVE mg/dL     Blood, Urine NEGATIVE NEGATIVE     Ketones, Urine 5 (TRACE) (A) NEGATIVE mg/dL     Bilirubin, Urine NEGATIVE NEGATIVE     Urobilinogen, Urine 4.0 (N) <2.0 mg/dL     Nitrite, Urine NEGATIVE NEGATIVE     Leukocyte Esterase, Urine NEGATIVE NEGATIVE   Microscopic Only, Urine   Result Value Ref Range     WBC, Urine 1-5 1-5, NONE /HPF     RBC, Urine 6-10 (A) NONE, 1-2, 3-5 /HPF     Mucus, Urine 2+ Reference range not established. /LPF     Hyaline Casts, Urine 3+ (A) NONE /LPF            Assessment/Plan   Reza is a 74-year-old male patient who is alert and oriented to self, presenting from skilled nursing facility for evaluation of a syncopal episode.  Patient states that he syncopized while having a bowel movement, woke up, and syncopized again.  Patient denies hitting his head or loss of consciousness.  Patient denies any complaints at this time, presented to ED hypotensive, received IV fluids and has no current complaints.  UTI negative, DYLON positive.  Imaging completed and negative for acute process.  Patient admitted for further medical management.     Syncope/hypotension/DYLON  Admit to telemetry observation per Dr. Feliz  See imaging results above  IV fluids in ED/continue IV infusion  Hold home antihypertensives  Cardiac diet  Telemetry monitoring  PT/OT     Dementia/NSTEMI/pneumonia/EtOH abuse/seizures  #Chronic conditions  Hold home antihypertensives until blood pressure is stable  Continue home medications when med rec is complete  Seizure precautions  Cardiac diet     DVT Ppx  SCDs  No chemical prophylaxis due to falls risk  Continue home Plavix/aspirin  Out of bed with assistance  PT/OT     I spent 25 minutes in the professional and overall care of this patient.  Siena Isidro, PIETRO-CNP     Addendum patient seen and evaluated today stable I have reviewed  his laboratories he has IV fluids running continue with his home medications agree with all the above management thank you                    Revision History                         Assessment/Plan   This patient currently has cardiac telemetry ordered; if you would like to modify or discontinue the telemetry order, click hereto go to the orders activity to modify/discontinue the order.                    Principal Problem:    Syncope, unspecified syncope type     Syncope difficulty really determine its etiology for now gave him fluids stabilized him he can go back to the ECF at any time discharge planning has been completed continue all present care and therapy thank you                                      Mikel Feliz MD

## 2024-04-24 LAB
ATRIAL RATE: 64 BPM
P AXIS: 66 DEGREES
PR INTERVAL: 161 MS
Q ONSET: 253 MS
QRS COUNT: 11 BEATS
QRS DURATION: 113 MS
QT INTERVAL: 394 MS
QTC CALCULATION(BAZETT): 410 MS
QTC FREDERICIA: 404 MS
R AXIS: 54 DEGREES
T AXIS: 87 DEGREES
T OFFSET: 450 MS
VENTRICULAR RATE: 65 BPM

## 2024-04-27 NOTE — DOCUMENTATION CLARIFICATION NOTE
"    PATIENT:               ERON ANDERSON  ACCT #:                  [id number]  MRN:                       [id number]  :                       1950  ADMIT DATE:       2024 11:16 PM  DISCH DATE:        2024 2:30 PM  RESPONDING PROVIDER #:        [id number]          PROVIDER RESPONSE TEXT:    Syncope due to dehydration    CDI QUERY TEXT:    Clarification      Instruction:    Based on your assessment of the patient and the clinical information, please provide the requested documentation by clicking on the appropriate radio button and enter any additional information if prompted.    Question: Please clarify etiology of syncope after workup    When answering this query, please exercise your independent professional judgment. The fact that a question is being asked, does not imply that any particular answer is desired or expected.    The patient's clinical indicators include:  Clinical Information: Patient presented after syncope episode with hypotension and DYLON    Clinical Indicators:  -ED note: \" EMS states nursing home staff informed them patient was having a bowel movement at nursing home when he had syncopal event as witnessed by nursing home staff. \"    -24 Medicine Progress note: \" Syncope, unspecified syncope type, looks like dehydration I am continuing with my IV fluids at this time at a rate of 75 an hour also his renal function would like to see if that improves his last creatinine of 2.03\"      Treatment: IVF's    Risk Factors: advanced age, NH patient  Options provided:  -- Syncope due to dehydration  -- Syncope due to other, Please specify additional information below  -- Other - I will add my own diagnosis  -- Refer to Clinical Documentation Reviewer    Query created by: Cindy Hernandez on 2024 2:49 PM      Electronically signed by:  MOE PÉREZ MD 2024 1:27 PM          "

## 2024-04-29 ENCOUNTER — LAB REQUISITION (OUTPATIENT)
Dept: LAB | Facility: HOSPITAL | Age: 74
End: 2024-04-29
Payer: MEDICAID

## 2024-04-29 ENCOUNTER — OFFICE VISIT (OUTPATIENT)
Dept: CARDIOLOGY | Facility: CLINIC | Age: 74
End: 2024-04-29
Payer: MEDICAID

## 2024-04-29 VITALS
HEIGHT: 70 IN | WEIGHT: 192 LBS | BODY MASS INDEX: 27.49 KG/M2 | OXYGEN SATURATION: 96 % | DIASTOLIC BLOOD PRESSURE: 78 MMHG | HEART RATE: 61 BPM | SYSTOLIC BLOOD PRESSURE: 122 MMHG

## 2024-04-29 DIAGNOSIS — I95.9 HYPOTENSION, UNSPECIFIED: ICD-10-CM

## 2024-04-29 DIAGNOSIS — I25.10 CORONARY ARTERY DISEASE INVOLVING NATIVE CORONARY ARTERY OF NATIVE HEART WITHOUT ANGINA PECTORIS: Primary | ICD-10-CM

## 2024-04-29 DIAGNOSIS — S37.009A UNSPECIFIED INJURY OF UNSPECIFIED KIDNEY, INITIAL ENCOUNTER: ICD-10-CM

## 2024-04-29 LAB
ANION GAP SERPL CALC-SCNC: 9 MMOL/L (ref 10–20)
BUN SERPL-MCNC: 14 MG/DL (ref 6–23)
CALCIUM SERPL-MCNC: 9.1 MG/DL (ref 8.6–10.3)
CHLORIDE SERPL-SCNC: 109 MMOL/L (ref 98–107)
CO2 SERPL-SCNC: 28 MMOL/L (ref 21–32)
CREAT SERPL-MCNC: 1.22 MG/DL (ref 0.5–1.3)
EGFRCR SERPLBLD CKD-EPI 2021: 61 ML/MIN/1.73M*2
ERYTHROCYTE [DISTWIDTH] IN BLOOD BY AUTOMATED COUNT: 15.9 % (ref 11.5–14.5)
GLUCOSE SERPL-MCNC: 98 MG/DL (ref 74–99)
HCT VFR BLD AUTO: 32 % (ref 41–52)
HGB BLD-MCNC: 10.2 G/DL (ref 13.5–17.5)
MCH RBC QN AUTO: 31.2 PG (ref 26–34)
MCHC RBC AUTO-ENTMCNC: 31.9 G/DL (ref 32–36)
MCV RBC AUTO: 98 FL (ref 80–100)
NRBC BLD-RTO: 0 /100 WBCS (ref 0–0)
PLATELET # BLD AUTO: 244 X10*3/UL (ref 150–450)
POTASSIUM SERPL-SCNC: 3.8 MMOL/L (ref 3.5–5.3)
RBC # BLD AUTO: 3.27 X10*6/UL (ref 4.5–5.9)
SODIUM SERPL-SCNC: 142 MMOL/L (ref 136–145)
WBC # BLD AUTO: 5.8 X10*3/UL (ref 4.4–11.3)

## 2024-04-29 PROCEDURE — 1111F DSCHRG MED/CURRENT MED MERGE: CPT | Performed by: INTERNAL MEDICINE

## 2024-04-29 PROCEDURE — 85027 COMPLETE CBC AUTOMATED: CPT | Mod: OUT | Performed by: INTERNAL MEDICINE

## 2024-04-29 PROCEDURE — 80048 BASIC METABOLIC PNL TOTAL CA: CPT | Mod: OUT | Performed by: INTERNAL MEDICINE

## 2024-04-29 PROCEDURE — 99214 OFFICE O/P EST MOD 30 MIN: CPT | Performed by: INTERNAL MEDICINE

## 2024-04-29 PROCEDURE — 1159F MED LIST DOCD IN RCRD: CPT | Performed by: INTERNAL MEDICINE

## 2024-04-29 PROCEDURE — 1036F TOBACCO NON-USER: CPT | Performed by: INTERNAL MEDICINE

## 2024-04-29 RX ORDER — POLYETHYLENE GLYCOL 3350 17 G/17G
17 POWDER, FOR SOLUTION ORAL
COMMUNITY
Start: 2023-10-09

## 2024-04-29 RX ORDER — ISOSORBIDE DINITRATE 30 MG/1
TABLET ORAL
COMMUNITY
Start: 2024-04-21

## 2024-04-29 RX ORDER — HYDROCORTISONE 25 MG/G
CREAM TOPICAL
COMMUNITY
Start: 2024-04-15

## 2024-04-29 NOTE — PROGRESS NOTES
Name : Reza Lockett   : 1950   MRN : [id number]   ENC Date : 2024      Assessment and Plan:  Presumed coronary artery disease: No obvious anginal symptoms.  Medication regimen seems to be working reasonably well.  Recent hospital stay for syncope patient had normal troponin.  Syncope: Unclear etiology.  Creatinine was elevated on admission suggesting some component of dehydration.  Normalized by time of discharge.  At this point there is no indication of bradycardia arrhythmia or tachyarrhythmia.  Dependent edema: This is probably venous insufficiency.  Given his recent syncopal event and issues with elevated creatinine I would not recommend we treat this with a diuretic.  If worsens over time would add compression stockings  Disp: RTO in 6 months or sooner if needed    HPI:  Patient returns today after having a syncopal episode earlier this month.  He was hospitalized with an elevated creatinine and was hydrated.  No obvious infection source was found.  He has not had any recurrent symptoms.  No angina.  Cardiac enzymes were unremarkable.    Problem list overview:   Patient Active Problem List   Diagnosis    Dementia without behavioral disturbance (Multi)    Non-ST elevation myocardial infarction (NSTEMI) in recovery phase (Multi)    Coronary artery disease involving native coronary artery of native heart without angina pectoris    Personal history of COVID-19    Syncope    Syncope, unspecified syncope type       Meds:   Current Outpatient Medications on File Prior to Visit   Medication Sig Dispense Refill    aspirin 81 mg capsule Take 81 mg by mouth once daily.      atorvastatin (Lipitor) 80 mg tablet Take 1 tablet (80 mg) by mouth once daily at bedtime.      clopidogrel (Plavix) 75 mg tablet Take 1 tablet (75 mg) by mouth once daily.      divalproex sprinkle (Depakote Sprinkle) 125 mg DR capsule Take 4 capsules (500 mg) by mouth 2 times a day.      donepezil (Aricept) 5 mg tablet Take 1 tablet (5  "mg) by mouth once daily at bedtime.      escitalopram (Lexapro) 10 mg tablet Take 1 tablet (10 mg) by mouth once daily.      hydrocortisone (Anusol-HC) 2.5 % rectal cream       isosorbide dinitrate (Isordil) 30 mg tablet       isosorbide mononitrate ER (Imdur) 30 mg 24 hr tablet Take 1 tablet (30 mg) by mouth once daily. Do not crush or chew. Do not start before October 11, 2023.      LACTOBACILLUS ACIDOPHILUS ORAL Take 1 tablet by mouth once daily.      metoprolol tartrate (Lopressor) 25 mg tablet Take 1 tablet (25 mg) by mouth 2 times a day.      pantoprazole (ProtoNix) 20 mg EC tablet Take 1 tablet (20 mg) by mouth once daily in the morning. Take before meals. Do not crush, chew, or split.      polyethylene glycol (Glycolax, Miralax) 17 gram packet Take 17 g by mouth once daily.       No current facility-administered medications on file prior to visit.        VS:  /78 (BP Location: Right arm, Patient Position: Sitting)   Pulse 61   Ht 1.778 m (5' 10\")   Wt 87.1 kg (192 lb)   SpO2 96%   BMI 27.55 kg/m²     Vitals reviewed.   Constitutional:       Comments: Seated in a wheelchair   Neck:      Vascular: No JVD.   Pulmonary:      Breath sounds: Normal breath sounds.   Cardiovascular:      Normal rate. Regular rhythm.      Murmurs: There is no murmur.      No gallop.    Edema:     Peripheral edema present.     Ankle: trace edema of the left ankle and 1+ edema of the right ankle.     Feet: trace edema of the left foot and 1+ edema of the right foot.  Abdominal:      General: Abdomen is flat.      Palpations: Abdomen is soft.   Skin:     General: Skin is warm.   Neurological:      Mental Status: Mental status is at baseline.      Coordination: Coordination abnormal.      Comments: Bilateral upper extremity tremor         Silas Trejo MD  "

## 2024-05-10 NOTE — ED PROCEDURE NOTE
Procedure  Critical Care    Performed by: Mikel Schwarz MD  Authorized by: Mikel Schwarz MD    Critical care provider statement:     Critical care time (minutes):  31    Critical care time was exclusive of:  Separately billable procedures and treating other patients    Critical care was necessary to treat or prevent imminent or life-threatening deterioration of the following conditions:  Circulatory failure    Critical care was time spent personally by me on the following activities:  Ordering and performing treatments and interventions, ordering and review of laboratory studies, ordering and review of radiographic studies, pulse oximetry, re-evaluation of patient's condition and review of old charts    Care discussed with: admitting provider                 Mikel Schwarz MD  05/10/24 0455

## 2024-05-13 ENCOUNTER — LAB REQUISITION (OUTPATIENT)
Dept: LAB | Facility: HOSPITAL | Age: 74
End: 2024-05-13
Payer: MEDICAID

## 2024-05-13 DIAGNOSIS — N17.9 ACUTE KIDNEY FAILURE, UNSPECIFIED (CMS-HCC): ICD-10-CM

## 2024-05-13 DIAGNOSIS — F03.90 UNSPECIFIED DEMENTIA, UNSPECIFIED SEVERITY, WITHOUT BEHAVIORAL DISTURBANCE, PSYCHOTIC DISTURBANCE, MOOD DISTURBANCE, AND ANXIETY (MULTI): ICD-10-CM

## 2024-05-13 DIAGNOSIS — J44.9 CHRONIC OBSTRUCTIVE PULMONARY DISEASE, UNSPECIFIED (MULTI): ICD-10-CM

## 2024-05-13 LAB
ANION GAP SERPL CALC-SCNC: 11 MMOL/L (ref 10–20)
BUN SERPL-MCNC: 15 MG/DL (ref 6–23)
CALCIUM SERPL-MCNC: 9 MG/DL (ref 8.6–10.3)
CHLORIDE SERPL-SCNC: 112 MMOL/L (ref 98–107)
CO2 SERPL-SCNC: 28 MMOL/L (ref 21–32)
CREAT SERPL-MCNC: 1.18 MG/DL (ref 0.5–1.3)
EGFRCR SERPLBLD CKD-EPI 2021: 64 ML/MIN/1.73M*2
ERYTHROCYTE [DISTWIDTH] IN BLOOD BY AUTOMATED COUNT: 15.2 % (ref 11.5–14.5)
GLUCOSE SERPL-MCNC: 83 MG/DL (ref 74–99)
HCT VFR BLD AUTO: 32 % (ref 41–52)
HGB BLD-MCNC: 9.9 G/DL (ref 13.5–17.5)
MCH RBC QN AUTO: 30.5 PG (ref 26–34)
MCHC RBC AUTO-ENTMCNC: 30.9 G/DL (ref 32–36)
MCV RBC AUTO: 99 FL (ref 80–100)
NRBC BLD-RTO: 0 /100 WBCS (ref 0–0)
PLATELET # BLD AUTO: 184 X10*3/UL (ref 150–450)
POTASSIUM SERPL-SCNC: 4.1 MMOL/L (ref 3.5–5.3)
RBC # BLD AUTO: 3.25 X10*6/UL (ref 4.5–5.9)
SODIUM SERPL-SCNC: 147 MMOL/L (ref 136–145)
WBC # BLD AUTO: 5.7 X10*3/UL (ref 4.4–11.3)

## 2024-05-13 PROCEDURE — 80048 BASIC METABOLIC PNL TOTAL CA: CPT | Mod: OUT | Performed by: INTERNAL MEDICINE

## 2024-05-13 PROCEDURE — 85027 COMPLETE CBC AUTOMATED: CPT | Mod: OUT | Performed by: INTERNAL MEDICINE

## 2024-05-24 ENCOUNTER — LAB REQUISITION (OUTPATIENT)
Dept: LAB | Facility: HOSPITAL | Age: 74
End: 2024-05-24
Payer: MEDICAID

## 2024-05-24 DIAGNOSIS — I50.20 UNSPECIFIED SYSTOLIC (CONGESTIVE) HEART FAILURE (MULTI): ICD-10-CM

## 2024-05-24 DIAGNOSIS — D64.9 ANEMIA, UNSPECIFIED: ICD-10-CM

## 2024-05-24 LAB
ANION GAP SERPL CALC-SCNC: 10 MMOL/L (ref 10–20)
APPEARANCE UR: CLEAR
BACTERIA #/AREA URNS AUTO: ABNORMAL /HPF
BILIRUB UR STRIP.AUTO-MCNC: NEGATIVE MG/DL
BUN SERPL-MCNC: 14 MG/DL (ref 6–23)
CALCIUM SERPL-MCNC: 9 MG/DL (ref 8.6–10.3)
CHLORIDE SERPL-SCNC: 110 MMOL/L (ref 98–107)
CO2 SERPL-SCNC: 27 MMOL/L (ref 21–32)
COLOR UR: ABNORMAL
CREAT SERPL-MCNC: 1.13 MG/DL (ref 0.5–1.3)
EGFRCR SERPLBLD CKD-EPI 2021: 67 ML/MIN/1.73M*2
ERYTHROCYTE [DISTWIDTH] IN BLOOD BY AUTOMATED COUNT: 14.6 % (ref 11.5–14.5)
GLUCOSE SERPL-MCNC: 79 MG/DL (ref 74–99)
GLUCOSE UR STRIP.AUTO-MCNC: NORMAL MG/DL
HCT VFR BLD AUTO: 36 % (ref 41–52)
HGB BLD-MCNC: 11.5 G/DL (ref 13.5–17.5)
HOLD SPECIMEN: NORMAL
KETONES UR STRIP.AUTO-MCNC: NEGATIVE MG/DL
LEUKOCYTE ESTERASE UR QL STRIP.AUTO: NEGATIVE
MAGNESIUM SERPL-MCNC: 2.11 MG/DL (ref 1.6–2.4)
MCH RBC QN AUTO: 30.8 PG (ref 26–34)
MCHC RBC AUTO-ENTMCNC: 31.9 G/DL (ref 32–36)
MCV RBC AUTO: 97 FL (ref 80–100)
NITRITE UR QL STRIP.AUTO: NEGATIVE
NRBC BLD-RTO: 0 /100 WBCS (ref 0–0)
PH UR STRIP.AUTO: 7 [PH]
PLATELET # BLD AUTO: 200 X10*3/UL (ref 150–450)
POTASSIUM SERPL-SCNC: 4 MMOL/L (ref 3.5–5.3)
PROT UR STRIP.AUTO-MCNC: ABNORMAL MG/DL
RBC # BLD AUTO: 3.73 X10*6/UL (ref 4.5–5.9)
RBC # UR STRIP.AUTO: ABNORMAL /UL
RBC #/AREA URNS AUTO: >20 /HPF
SODIUM SERPL-SCNC: 143 MMOL/L (ref 136–145)
SP GR UR STRIP.AUTO: 1.02
UROBILINOGEN UR STRIP.AUTO-MCNC: NORMAL MG/DL
VALPROATE SERPL-MCNC: 51 UG/ML (ref 50–100)
WBC # BLD AUTO: 5.7 X10*3/UL (ref 4.4–11.3)
WBC #/AREA URNS AUTO: ABNORMAL /HPF

## 2024-05-24 PROCEDURE — 82374 ASSAY BLOOD CARBON DIOXIDE: CPT | Mod: OUT | Performed by: INTERNAL MEDICINE

## 2024-05-24 PROCEDURE — 80164 ASSAY DIPROPYLACETIC ACD TOT: CPT | Mod: OUT | Performed by: INTERNAL MEDICINE

## 2024-05-24 PROCEDURE — 81001 URINALYSIS AUTO W/SCOPE: CPT | Mod: OUT | Performed by: INTERNAL MEDICINE

## 2024-05-24 PROCEDURE — 83735 ASSAY OF MAGNESIUM: CPT | Mod: OUT | Performed by: INTERNAL MEDICINE

## 2024-05-24 PROCEDURE — 85027 COMPLETE CBC AUTOMATED: CPT | Mod: OUT | Performed by: INTERNAL MEDICINE

## 2024-06-13 ENCOUNTER — LAB REQUISITION (OUTPATIENT)
Dept: LAB | Facility: HOSPITAL | Age: 74
End: 2024-06-13
Payer: MEDICAID

## 2024-06-13 DIAGNOSIS — J44.9 CHRONIC OBSTRUCTIVE PULMONARY DISEASE, UNSPECIFIED (MULTI): ICD-10-CM

## 2024-06-13 DIAGNOSIS — I10 ESSENTIAL (PRIMARY) HYPERTENSION: ICD-10-CM

## 2024-06-13 DIAGNOSIS — N17.9 ACUTE KIDNEY FAILURE, UNSPECIFIED (CMS-HCC): ICD-10-CM

## 2024-06-13 DIAGNOSIS — F03.90 UNSPECIFIED DEMENTIA, UNSPECIFIED SEVERITY, WITHOUT BEHAVIORAL DISTURBANCE, PSYCHOTIC DISTURBANCE, MOOD DISTURBANCE, AND ANXIETY (MULTI): ICD-10-CM

## 2024-06-13 LAB
ANION GAP SERPL CALC-SCNC: 11 MMOL/L (ref 10–20)
BUN SERPL-MCNC: 13 MG/DL (ref 6–23)
CALCIUM SERPL-MCNC: 8.8 MG/DL (ref 8.6–10.3)
CHLORIDE SERPL-SCNC: 109 MMOL/L (ref 98–107)
CO2 SERPL-SCNC: 27 MMOL/L (ref 21–32)
CREAT SERPL-MCNC: 1.2 MG/DL (ref 0.5–1.3)
EGFRCR SERPLBLD CKD-EPI 2021: 63 ML/MIN/1.73M*2
ERYTHROCYTE [DISTWIDTH] IN BLOOD BY AUTOMATED COUNT: 14.7 % (ref 11.5–14.5)
GLUCOSE SERPL-MCNC: 83 MG/DL (ref 74–99)
HCT VFR BLD AUTO: 32.7 % (ref 41–52)
HGB BLD-MCNC: 10.6 G/DL (ref 13.5–17.5)
MCH RBC QN AUTO: 30.5 PG (ref 26–34)
MCHC RBC AUTO-ENTMCNC: 32.4 G/DL (ref 32–36)
MCV RBC AUTO: 94 FL (ref 80–100)
NRBC BLD-RTO: 0 /100 WBCS (ref 0–0)
PLATELET # BLD AUTO: 172 X10*3/UL (ref 150–450)
POTASSIUM SERPL-SCNC: 4.3 MMOL/L (ref 3.5–5.3)
RBC # BLD AUTO: 3.47 X10*6/UL (ref 4.5–5.9)
SODIUM SERPL-SCNC: 143 MMOL/L (ref 136–145)
WBC # BLD AUTO: 4.6 X10*3/UL (ref 4.4–11.3)

## 2024-06-13 PROCEDURE — 85027 COMPLETE CBC AUTOMATED: CPT | Mod: OUT | Performed by: INTERNAL MEDICINE

## 2024-06-13 PROCEDURE — 82374 ASSAY BLOOD CARBON DIOXIDE: CPT | Mod: OUT | Performed by: INTERNAL MEDICINE

## 2024-07-27 ENCOUNTER — LAB REQUISITION (OUTPATIENT)
Dept: LAB | Facility: HOSPITAL | Age: 74
End: 2024-07-27
Payer: MEDICAID

## 2024-07-27 DIAGNOSIS — F03.911 UNSPECIFIED DEMENTIA, UNSPECIFIED SEVERITY, WITH AGITATION (MULTI): ICD-10-CM

## 2024-07-27 DIAGNOSIS — F03.90 UNSPECIFIED DEMENTIA, UNSPECIFIED SEVERITY, WITHOUT BEHAVIORAL DISTURBANCE, PSYCHOTIC DISTURBANCE, MOOD DISTURBANCE, AND ANXIETY (MULTI): ICD-10-CM

## 2024-07-27 LAB
ANION GAP SERPL CALC-SCNC: 13 MMOL/L (ref 10–20)
BUN SERPL-MCNC: 16 MG/DL (ref 6–23)
CALCIUM SERPL-MCNC: 9.4 MG/DL (ref 8.6–10.3)
CHLORIDE SERPL-SCNC: 109 MMOL/L (ref 98–107)
CO2 SERPL-SCNC: 25 MMOL/L (ref 21–32)
CREAT SERPL-MCNC: 1.29 MG/DL (ref 0.5–1.3)
EGFRCR SERPLBLD CKD-EPI 2021: 57 ML/MIN/1.73M*2
ERYTHROCYTE [DISTWIDTH] IN BLOOD BY AUTOMATED COUNT: 15.6 % (ref 11.5–14.5)
GLUCOSE SERPL-MCNC: 96 MG/DL (ref 74–99)
HCT VFR BLD AUTO: 36.1 % (ref 41–52)
HGB BLD-MCNC: 11.6 G/DL (ref 13.5–17.5)
MCH RBC QN AUTO: 30.4 PG (ref 26–34)
MCHC RBC AUTO-ENTMCNC: 32.1 G/DL (ref 32–36)
MCV RBC AUTO: 95 FL (ref 80–100)
NRBC BLD-RTO: 0 /100 WBCS (ref 0–0)
PLATELET # BLD AUTO: 212 X10*3/UL (ref 150–450)
POTASSIUM SERPL-SCNC: 4 MMOL/L (ref 3.5–5.3)
RBC # BLD AUTO: 3.82 X10*6/UL (ref 4.5–5.9)
SODIUM SERPL-SCNC: 143 MMOL/L (ref 136–145)
WBC # BLD AUTO: 4.7 X10*3/UL (ref 4.4–11.3)

## 2024-07-27 PROCEDURE — 80048 BASIC METABOLIC PNL TOTAL CA: CPT | Mod: OUT | Performed by: INTERNAL MEDICINE

## 2024-07-27 PROCEDURE — 85027 COMPLETE CBC AUTOMATED: CPT | Mod: OUT | Performed by: INTERNAL MEDICINE

## 2024-10-09 ENCOUNTER — LAB REQUISITION (OUTPATIENT)
Dept: LAB | Facility: HOSPITAL | Age: 74
End: 2024-10-09
Payer: MEDICAID

## 2024-10-09 DIAGNOSIS — R53.83 OTHER FATIGUE: ICD-10-CM

## 2024-10-09 DIAGNOSIS — R45.1 RESTLESSNESS AND AGITATION: ICD-10-CM

## 2024-10-09 LAB
ALBUMIN SERPL BCP-MCNC: 3.7 G/DL (ref 3.4–5)
ALP SERPL-CCNC: 35 U/L (ref 33–136)
ALT SERPL W P-5'-P-CCNC: 10 U/L (ref 10–52)
ANION GAP SERPL CALC-SCNC: 10 MMOL/L (ref 10–20)
AST SERPL W P-5'-P-CCNC: 18 U/L (ref 9–39)
BILIRUB SERPL-MCNC: 0.7 MG/DL (ref 0–1.2)
BUN SERPL-MCNC: 16 MG/DL (ref 6–23)
CALCIUM SERPL-MCNC: 9.3 MG/DL (ref 8.6–10.3)
CHLORIDE SERPL-SCNC: 113 MMOL/L (ref 98–107)
CO2 SERPL-SCNC: 29 MMOL/L (ref 21–32)
CREAT SERPL-MCNC: 1.05 MG/DL (ref 0.5–1.3)
EGFRCR SERPLBLD CKD-EPI 2021: 74 ML/MIN/1.73M*2
ERYTHROCYTE [DISTWIDTH] IN BLOOD BY AUTOMATED COUNT: 15.7 % (ref 11.5–14.5)
GLUCOSE SERPL-MCNC: 89 MG/DL (ref 74–99)
HCT VFR BLD AUTO: 35.6 % (ref 41–52)
HGB BLD-MCNC: 11.4 G/DL (ref 13.5–17.5)
MCH RBC QN AUTO: 30.6 PG (ref 26–34)
MCHC RBC AUTO-ENTMCNC: 32 G/DL (ref 32–36)
MCV RBC AUTO: 96 FL (ref 80–100)
NRBC BLD-RTO: 0 /100 WBCS (ref 0–0)
PLATELET # BLD AUTO: 198 X10*3/UL (ref 150–450)
POTASSIUM SERPL-SCNC: 4.6 MMOL/L (ref 3.5–5.3)
PROT SERPL-MCNC: 6 G/DL (ref 6.4–8.2)
RBC # BLD AUTO: 3.72 X10*6/UL (ref 4.5–5.9)
SODIUM SERPL-SCNC: 147 MMOL/L (ref 136–145)
WBC # BLD AUTO: 5.3 X10*3/UL (ref 4.4–11.3)

## 2024-10-09 PROCEDURE — 85027 COMPLETE CBC AUTOMATED: CPT | Mod: OUT | Performed by: INTERNAL MEDICINE

## 2024-10-09 PROCEDURE — 80053 COMPREHEN METABOLIC PANEL: CPT | Mod: OUT | Performed by: INTERNAL MEDICINE

## 2024-10-09 PROCEDURE — 36415 COLL VENOUS BLD VENIPUNCTURE: CPT | Mod: OUT | Performed by: INTERNAL MEDICINE

## 2024-11-04 ENCOUNTER — APPOINTMENT (OUTPATIENT)
Dept: CARDIOLOGY | Facility: CLINIC | Age: 74
End: 2024-11-04
Payer: MEDICAID